# Patient Record
Sex: MALE | Race: WHITE | NOT HISPANIC OR LATINO | URBAN - METROPOLITAN AREA
[De-identification: names, ages, dates, MRNs, and addresses within clinical notes are randomized per-mention and may not be internally consistent; named-entity substitution may affect disease eponyms.]

---

## 2022-01-27 ENCOUNTER — INPATIENT (INPATIENT)
Facility: HOSPITAL | Age: 82
LOS: 0 days | Discharge: HOME | DRG: 310 | End: 2022-01-28
Attending: HOSPITALIST | Admitting: HOSPITALIST
Payer: COMMERCIAL

## 2022-01-27 VITALS
HEART RATE: 174 BPM | HEIGHT: 70 IN | DIASTOLIC BLOOD PRESSURE: 62 MMHG | RESPIRATION RATE: 18 BRPM | OXYGEN SATURATION: 96 % | SYSTOLIC BLOOD PRESSURE: 92 MMHG | WEIGHT: 184.09 LBS

## 2022-01-27 DIAGNOSIS — Z96.641 PRESENCE OF RIGHT ARTIFICIAL HIP JOINT: Chronic | ICD-10-CM

## 2022-01-27 DIAGNOSIS — I48.91 UNSPECIFIED ATRIAL FIBRILLATION: ICD-10-CM

## 2022-01-27 DIAGNOSIS — E78.5 HYPERLIPIDEMIA, UNSPECIFIED: ICD-10-CM

## 2022-01-27 DIAGNOSIS — R07.9 CHEST PAIN, UNSPECIFIED: ICD-10-CM

## 2022-01-27 LAB
ALBUMIN SERPL ELPH-MCNC: 3.7 G/DL — SIGNIFICANT CHANGE UP (ref 3.3–5)
ALP SERPL-CCNC: 58 U/L — SIGNIFICANT CHANGE UP (ref 40–120)
ALT FLD-CCNC: 17 U/L — SIGNIFICANT CHANGE UP (ref 10–45)
ANION GAP SERPL CALC-SCNC: 11 MMOL/L — SIGNIFICANT CHANGE UP (ref 5–17)
APTT BLD: 36 SEC — HIGH (ref 27.5–35.5)
AST SERPL-CCNC: 22 U/L — SIGNIFICANT CHANGE UP (ref 10–40)
BASOPHILS # BLD AUTO: 0.02 K/UL — SIGNIFICANT CHANGE UP (ref 0–0.2)
BASOPHILS NFR BLD AUTO: 0.3 % — SIGNIFICANT CHANGE UP (ref 0–2)
BILIRUB SERPL-MCNC: 0.3 MG/DL — SIGNIFICANT CHANGE UP (ref 0.2–1.2)
BUN SERPL-MCNC: 18 MG/DL — SIGNIFICANT CHANGE UP (ref 7–23)
CALCIUM SERPL-MCNC: 8.8 MG/DL — SIGNIFICANT CHANGE UP (ref 8.4–10.5)
CHLORIDE SERPL-SCNC: 113 MMOL/L — HIGH (ref 96–108)
CO2 SERPL-SCNC: 21 MMOL/L — LOW (ref 22–31)
CREAT SERPL-MCNC: 0.99 MG/DL — SIGNIFICANT CHANGE UP (ref 0.5–1.3)
EOSINOPHIL # BLD AUTO: 0.05 K/UL — SIGNIFICANT CHANGE UP (ref 0–0.5)
EOSINOPHIL NFR BLD AUTO: 0.6 % — SIGNIFICANT CHANGE UP (ref 0–6)
GLUCOSE SERPL-MCNC: 113 MG/DL — HIGH (ref 70–99)
HCT VFR BLD CALC: 39.3 % — SIGNIFICANT CHANGE UP (ref 39–50)
HGB BLD-MCNC: 13 G/DL — SIGNIFICANT CHANGE UP (ref 13–17)
IMM GRANULOCYTES NFR BLD AUTO: 0.3 % — SIGNIFICANT CHANGE UP (ref 0–1.5)
INR BLD: 1.05 — SIGNIFICANT CHANGE UP (ref 0.88–1.16)
LYMPHOCYTES # BLD AUTO: 0.76 K/UL — LOW (ref 1–3.3)
LYMPHOCYTES # BLD AUTO: 9.6 % — LOW (ref 13–44)
MCHC RBC-ENTMCNC: 31 PG — SIGNIFICANT CHANGE UP (ref 27–34)
MCHC RBC-ENTMCNC: 33.1 GM/DL — SIGNIFICANT CHANGE UP (ref 32–36)
MCV RBC AUTO: 93.8 FL — SIGNIFICANT CHANGE UP (ref 80–100)
MONOCYTES # BLD AUTO: 0.41 K/UL — SIGNIFICANT CHANGE UP (ref 0–0.9)
MONOCYTES NFR BLD AUTO: 5.2 % — SIGNIFICANT CHANGE UP (ref 2–14)
NEUTROPHILS # BLD AUTO: 6.64 K/UL — SIGNIFICANT CHANGE UP (ref 1.8–7.4)
NEUTROPHILS NFR BLD AUTO: 84 % — HIGH (ref 43–77)
NRBC # BLD: 0 /100 WBCS — SIGNIFICANT CHANGE UP (ref 0–0)
NT-PROBNP SERPL-SCNC: 114 PG/ML — SIGNIFICANT CHANGE UP (ref 0–300)
PLATELET # BLD AUTO: 210 K/UL — SIGNIFICANT CHANGE UP (ref 150–400)
POTASSIUM SERPL-MCNC: 3.8 MMOL/L — SIGNIFICANT CHANGE UP (ref 3.5–5.3)
POTASSIUM SERPL-SCNC: 3.8 MMOL/L — SIGNIFICANT CHANGE UP (ref 3.5–5.3)
PROT SERPL-MCNC: 5.6 G/DL — LOW (ref 6–8.3)
PROTHROM AB SERPL-ACNC: 12.6 SEC — SIGNIFICANT CHANGE UP (ref 10.6–13.6)
RBC # BLD: 4.19 M/UL — LOW (ref 4.2–5.8)
RBC # FLD: 12.7 % — SIGNIFICANT CHANGE UP (ref 10.3–14.5)
SARS-COV-2 RNA SPEC QL NAA+PROBE: SIGNIFICANT CHANGE UP
SODIUM SERPL-SCNC: 145 MMOL/L — SIGNIFICANT CHANGE UP (ref 135–145)
TROPONIN T SERPL-MCNC: 0.01 NG/ML — SIGNIFICANT CHANGE UP (ref 0–0.01)
WBC # BLD: 7.9 K/UL — SIGNIFICANT CHANGE UP (ref 3.8–10.5)
WBC # FLD AUTO: 7.9 K/UL — SIGNIFICANT CHANGE UP (ref 3.8–10.5)

## 2022-01-27 PROCEDURE — 99291 CRITICAL CARE FIRST HOUR: CPT | Mod: 25

## 2022-01-27 PROCEDURE — 71045 X-RAY EXAM CHEST 1 VIEW: CPT | Mod: 26

## 2022-01-27 PROCEDURE — 99223 1ST HOSP IP/OBS HIGH 75: CPT

## 2022-01-27 PROCEDURE — 93010 ELECTROCARDIOGRAM REPORT: CPT

## 2022-01-27 RX ORDER — SODIUM CHLORIDE 9 MG/ML
1000 INJECTION INTRAMUSCULAR; INTRAVENOUS; SUBCUTANEOUS ONCE
Refills: 0 | Status: COMPLETED | OUTPATIENT
Start: 2022-01-27 | End: 2022-01-27

## 2022-01-27 RX ORDER — METOPROLOL TARTRATE 50 MG
25 TABLET ORAL
Refills: 0 | Status: DISCONTINUED | OUTPATIENT
Start: 2022-01-27 | End: 2022-01-28

## 2022-01-27 RX ORDER — SIMVASTATIN 20 MG/1
20 TABLET, FILM COATED ORAL AT BEDTIME
Refills: 0 | Status: DISCONTINUED | OUTPATIENT
Start: 2022-01-27 | End: 2022-01-28

## 2022-01-27 RX ORDER — METOPROLOL TARTRATE 50 MG
5 TABLET ORAL ONCE
Refills: 0 | Status: COMPLETED | OUTPATIENT
Start: 2022-01-27 | End: 2022-01-27

## 2022-01-27 RX ORDER — HEPARIN SODIUM 5000 [USP'U]/ML
INJECTION INTRAVENOUS; SUBCUTANEOUS
Qty: 25000 | Refills: 0 | Status: DISCONTINUED | OUTPATIENT
Start: 2022-01-27 | End: 2022-01-28

## 2022-01-27 RX ORDER — MAGNESIUM SULFATE 500 MG/ML
4 VIAL (ML) INJECTION ONCE
Refills: 0 | Status: COMPLETED | OUTPATIENT
Start: 2022-01-27 | End: 2022-01-27

## 2022-01-27 RX ADMIN — Medication 25 MILLIGRAM(S): at 21:48

## 2022-01-27 RX ADMIN — HEPARIN SODIUM 1500 UNIT(S)/HR: 5000 INJECTION INTRAVENOUS; SUBCUTANEOUS at 22:03

## 2022-01-27 RX ADMIN — HEPARIN SODIUM 1500 UNIT(S)/HR: 5000 INJECTION INTRAVENOUS; SUBCUTANEOUS at 21:15

## 2022-01-27 RX ADMIN — Medication 200 GRAM(S): at 17:33

## 2022-01-27 RX ADMIN — SODIUM CHLORIDE 1000 MILLILITER(S): 9 INJECTION INTRAMUSCULAR; INTRAVENOUS; SUBCUTANEOUS at 16:46

## 2022-01-27 RX ADMIN — Medication 5 MILLIGRAM(S): at 17:36

## 2022-01-27 RX ADMIN — SIMVASTATIN 20 MILLIGRAM(S): 20 TABLET, FILM COATED ORAL at 23:03

## 2022-01-27 RX ADMIN — Medication 5 MILLIGRAM(S): at 16:50

## 2022-01-27 NOTE — H&P ADULT - PROBLEM SELECTOR PLAN 1
upon arrival to ED HR ranging 130s-170s, EKG revealed Afib with RVR@133BPM, no acute ischemic changes.   --received Lopressor 5mg IV x 2 doses with improvement in HR to 110s in ED.  --will start Metoprolol Tartrate 25mg PO BID for rate control and continue Heparin gtt for anticoagulation.  --obtain Echocardiogram for structural assessment, will F/U thyroid studies.  --NPO after midnight for possible EP consult/DCCV.

## 2022-01-27 NOTE — ED ADULT NURSE NOTE - OBJECTIVE STATEMENT
Pt presents to ED by EMS C/O chest discomfort, dizziness, and weakness x 1 hour. As per EMS EKG, pt. in Afib.

## 2022-01-27 NOTE — H&P ADULT - ASSESSMENT
81 yr old M with strong FHx of CAD, PMHx of polio, hyperlipidemia who presents to [facility] ED 1/27/22 c/o SSCP and palpitations x 1 hour, EKG revealed Afib with RVR, CE negative x 1 set, admitted to cardiac telemetry for management of new onset Afib and further cardiac work-up.

## 2022-01-27 NOTE — H&P ADULT - NSICDXFAMILYHX_GEN_ALL_CORE_FT
FAMILY HISTORY:  Sibling  Still living? Unknown  FH: atrial fibrillation, Age at diagnosis: 51-60  FH: heart disease, Age at diagnosis: 61-70

## 2022-01-27 NOTE — ED ADULT TRIAGE NOTE - NS ED TRIAGE AVPU SCALE
Present, accurate, and signed
Alert-The patient is alert, awake and responds to voice. The patient is oriented to time, place, and person. The triage nurse is able to obtain subjective information.

## 2022-01-27 NOTE — ED ADULT TRIAGE NOTE - CHIEF COMPLAINT QUOTE
Pt presents to ED by EMS C/O chest discomfort, dizziness, and weakness x 1 hour. EMS reports pt in rapid AFIB. No known hx of AFIB, pt has family cardiac hx. Denies blood thinners. Pt alert and awake, appears pale and nervous, Wife at bedside. L hand #18 placed PTA. Pt has EKG in progress.

## 2022-01-27 NOTE — ED PROVIDER NOTE - OBJECTIVE STATEMENT
80 y/o M with PMHx of polio at age 6 with multiple surgeries, multiple ortho surgeries, HLD on statin, no hx of CHF or CAD. Patient went to ortho for injection to back today, walking home and on the way felt SOB, palpitations ("heart beating fast") and got dizzy. On presentation patient is in rapid afib, no hx of afib in past. No chest pain. PCP at [facility], no dedicated cardiology f/u. Deneis fever, chills, leg swelling, chest pain, headache, or any other complaints. No cardiac w/u in recent history.

## 2022-01-27 NOTE — H&P ADULT - PROBLEM SELECTOR PLAN 3
continue HOME MED: Simvastatin 20mg PO qhs        N: DASH, NPO after midnight  E: Maintain K>4.0 and Mg >2.0  VTE PPx: on Heparin gtt  Code: Full

## 2022-01-27 NOTE — H&P ADULT - PROBLEM SELECTOR PLAN 2
currently chest pain free, symptoms improved after HR controlled.  --EKG Afib without ischemic changes.  --CE negative x 1 set, will F/U CE@10PM and 5AM.  --F/U Echo results, consider ischemic eval.

## 2022-01-27 NOTE — ED PROVIDER NOTE - CLINICAL SUMMARY MEDICAL DECISION MAKING FREE TEXT BOX
82 y/o M with new onset rapid afib. No hx CAD, CHF, does not appear to be clinically overloaded or in heart failure. HR ranging from 130s to 160s. Will obtain cardiac labs, give Saline 1L, Lopressor 5, get bedside echo. Possible admission for new onset afib. 82 y/o M with new onset rapid afib. No hx CAD, CHF, does not appear to be clinically overloaded or in heart failure. HR ranging from 130s to 160s. Will obtain cardiac labs, give Saline 1L, Lopressor 5, get bedside echo. Possible admission for new onset afib.    Lopressor 5IV x 2 and Mg 4 G IV - rate controlled to 100-110 - discussed with cards fellow - agrees with heparin drip and admission for further workup r/o ischemia as cause of a fib.

## 2022-01-27 NOTE — H&P ADULT - HISTORY OF PRESENT ILLNESS
81 yr old M with PMHx of polio (at age 6 with multiple ortho surgeries), hyperlipidemia who presents to [facility] ED 1/27/22 c/o SOB and palpitations x 1 hour. Patient reports he went to his orthopedist to receive a back injection, on his walk back home he felt sudden onset of palpitations, dizziness and SOB. Patient denies any N/V, diaphoresis, chest pain, PND, orthopnea, LE edema, recent travel or sick contacts.    In ED, BP: 92/62, HR: 130s-170s, RR:18, Temp: 98F, O2 sat: 96% RA. EKG revealed Afib with RVR with VR 130s, no acute ST/T wave changes. CXR unremarkable. Labs notable for: Cardiac enzymes negative x 1 set, .     Patient treated with 1 liter IVFs, Lopressor 5mg IV x 2 doses and Mg 4g IV x 1 dose. Patient also started on Heparin gtt for anticoagulation.    Patient currently stable, admitted to cardiac telemetry for management of new onset Afib.   81 yr old M with strong FHx of CAD (brother had MI in his 60s), PMHx of polio (at age 6 with multiple ortho surgeries), hyperlipidemia who presents to [facility] ED 1/27/22 c/o SSCP and palpitations x 1 hour. Patient reports earlier today he went to his orthopedist to receive a back injection for lower back pain and was in his usual state of health. However, a few hours after his appointment he felt sudden onset of sudden onset of nonradiating substernal chest pressure, palpitations and dizziness. Patient denies any N/V, diaphoresis, SOB, PND, orthopnea, LE edema, recent travel or sick contacts. Patient denies similar symptoms in the past, last Echocardiogram was ~15 years ago which was normal.    In ED, BP: 92/62, HR: 130s-170s, RR:18, Temp: 98F, O2 sat: 96% RA. EKG revealed Afib with RVR with VR 130s, no acute ST/T wave changes. CXR unremarkable. Labs notable for: Cardiac enzymes negative x 1 set, . Patient treated with 1 liter IVFs, Lopressor 5mg IV x 2 doses and Mg 4g IV x 1 dose. Patient also started on Heparin gtt for anticoagulation.    Patient currently stable, admitted to cardiac telemetry for management of new onset Afib.

## 2022-01-28 ENCOUNTER — TRANSCRIPTION ENCOUNTER (OUTPATIENT)
Age: 82
End: 2022-01-28

## 2022-01-28 VITALS — TEMPERATURE: 97 F

## 2022-01-28 LAB
A1C WITH ESTIMATED AVERAGE GLUCOSE RESULT: 5.9 % — HIGH (ref 4–5.6)
ANION GAP SERPL CALC-SCNC: 11 MMOL/L — SIGNIFICANT CHANGE UP (ref 5–17)
ANION GAP SERPL CALC-SCNC: 9 MMOL/L — SIGNIFICANT CHANGE UP (ref 5–17)
APTT BLD: 130.5 SEC — CRITICAL HIGH (ref 27.5–35.5)
BUN SERPL-MCNC: 21 MG/DL — SIGNIFICANT CHANGE UP (ref 7–23)
BUN SERPL-MCNC: 23 MG/DL — SIGNIFICANT CHANGE UP (ref 7–23)
CALCIUM SERPL-MCNC: 9.1 MG/DL — SIGNIFICANT CHANGE UP (ref 8.4–10.5)
CALCIUM SERPL-MCNC: 9.2 MG/DL — SIGNIFICANT CHANGE UP (ref 8.4–10.5)
CHLORIDE SERPL-SCNC: 109 MMOL/L — HIGH (ref 96–108)
CHLORIDE SERPL-SCNC: 112 MMOL/L — HIGH (ref 96–108)
CHOLEST SERPL-MCNC: 117 MG/DL — SIGNIFICANT CHANGE UP
CK MB CFR SERPL CALC: 2.5 NG/ML — SIGNIFICANT CHANGE UP (ref 0–6.7)
CK MB CFR SERPL CALC: 2.5 NG/ML — SIGNIFICANT CHANGE UP (ref 0–6.7)
CK SERPL-CCNC: 88 U/L — SIGNIFICANT CHANGE UP (ref 30–200)
CK SERPL-CCNC: 93 U/L — SIGNIFICANT CHANGE UP (ref 30–200)
CO2 SERPL-SCNC: 22 MMOL/L — SIGNIFICANT CHANGE UP (ref 22–31)
CO2 SERPL-SCNC: 22 MMOL/L — SIGNIFICANT CHANGE UP (ref 22–31)
CREAT SERPL-MCNC: 0.92 MG/DL — SIGNIFICANT CHANGE UP (ref 0.5–1.3)
CREAT SERPL-MCNC: 0.97 MG/DL — SIGNIFICANT CHANGE UP (ref 0.5–1.3)
ESTIMATED AVERAGE GLUCOSE: 123 MG/DL — HIGH (ref 68–114)
GLUCOSE SERPL-MCNC: 119 MG/DL — HIGH (ref 70–99)
GLUCOSE SERPL-MCNC: 140 MG/DL — HIGH (ref 70–99)
HCT VFR BLD CALC: 43.8 % — SIGNIFICANT CHANGE UP (ref 39–50)
HDLC SERPL-MCNC: 60 MG/DL — SIGNIFICANT CHANGE UP
HGB BLD-MCNC: 13.7 G/DL — SIGNIFICANT CHANGE UP (ref 13–17)
LIPID PNL WITH DIRECT LDL SERPL: 47 MG/DL — SIGNIFICANT CHANGE UP
MAGNESIUM SERPL-MCNC: 2.4 MG/DL — SIGNIFICANT CHANGE UP (ref 1.6–2.6)
MCHC RBC-ENTMCNC: 30.3 PG — SIGNIFICANT CHANGE UP (ref 27–34)
MCHC RBC-ENTMCNC: 31.3 GM/DL — LOW (ref 32–36)
MCV RBC AUTO: 96.9 FL — SIGNIFICANT CHANGE UP (ref 80–100)
NON HDL CHOLESTEROL: 57 MG/DL — SIGNIFICANT CHANGE UP
NRBC # BLD: 0 /100 WBCS — SIGNIFICANT CHANGE UP (ref 0–0)
PLATELET # BLD AUTO: 231 K/UL — SIGNIFICANT CHANGE UP (ref 150–400)
POTASSIUM SERPL-MCNC: 3.9 MMOL/L — SIGNIFICANT CHANGE UP (ref 3.5–5.3)
POTASSIUM SERPL-MCNC: 4 MMOL/L — SIGNIFICANT CHANGE UP (ref 3.5–5.3)
POTASSIUM SERPL-SCNC: 3.9 MMOL/L — SIGNIFICANT CHANGE UP (ref 3.5–5.3)
POTASSIUM SERPL-SCNC: 4 MMOL/L — SIGNIFICANT CHANGE UP (ref 3.5–5.3)
RBC # BLD: 4.52 M/UL — SIGNIFICANT CHANGE UP (ref 4.2–5.8)
RBC # FLD: 13 % — SIGNIFICANT CHANGE UP (ref 10.3–14.5)
SODIUM SERPL-SCNC: 142 MMOL/L — SIGNIFICANT CHANGE UP (ref 135–145)
SODIUM SERPL-SCNC: 143 MMOL/L — SIGNIFICANT CHANGE UP (ref 135–145)
T4 AB SER-ACNC: 6.27 UG/DL — SIGNIFICANT CHANGE UP (ref 4.5–11.7)
TRIGL SERPL-MCNC: 49 MG/DL — SIGNIFICANT CHANGE UP
TROPONIN T SERPL-MCNC: 0.01 NG/ML — SIGNIFICANT CHANGE UP (ref 0–0.01)
TROPONIN T SERPL-MCNC: <0.01 NG/ML — SIGNIFICANT CHANGE UP (ref 0–0.01)
TSH SERPL-MCNC: 2.38 UIU/ML — SIGNIFICANT CHANGE UP (ref 0.27–4.2)
WBC # BLD: 9.84 K/UL — SIGNIFICANT CHANGE UP (ref 3.8–10.5)
WBC # FLD AUTO: 9.84 K/UL — SIGNIFICANT CHANGE UP (ref 3.8–10.5)

## 2022-01-28 PROCEDURE — 99239 HOSP IP/OBS DSCHRG MGMT >30: CPT

## 2022-01-28 PROCEDURE — U0003: CPT

## 2022-01-28 PROCEDURE — 93005 ELECTROCARDIOGRAM TRACING: CPT

## 2022-01-28 PROCEDURE — 96374 THER/PROPH/DIAG INJ IV PUSH: CPT

## 2022-01-28 PROCEDURE — 99291 CRITICAL CARE FIRST HOUR: CPT | Mod: 25

## 2022-01-28 PROCEDURE — 36415 COLL VENOUS BLD VENIPUNCTURE: CPT

## 2022-01-28 PROCEDURE — 85730 THROMBOPLASTIN TIME PARTIAL: CPT

## 2022-01-28 PROCEDURE — 84484 ASSAY OF TROPONIN QUANT: CPT

## 2022-01-28 PROCEDURE — 84443 ASSAY THYROID STIM HORMONE: CPT

## 2022-01-28 PROCEDURE — 85610 PROTHROMBIN TIME: CPT

## 2022-01-28 PROCEDURE — 84436 ASSAY OF TOTAL THYROXINE: CPT

## 2022-01-28 PROCEDURE — 99223 1ST HOSP IP/OBS HIGH 75: CPT

## 2022-01-28 PROCEDURE — 82553 CREATINE MB FRACTION: CPT

## 2022-01-28 PROCEDURE — 82550 ASSAY OF CK (CPK): CPT

## 2022-01-28 PROCEDURE — 85027 COMPLETE CBC AUTOMATED: CPT

## 2022-01-28 PROCEDURE — U0005: CPT

## 2022-01-28 PROCEDURE — 83036 HEMOGLOBIN GLYCOSYLATED A1C: CPT

## 2022-01-28 PROCEDURE — 83880 ASSAY OF NATRIURETIC PEPTIDE: CPT

## 2022-01-28 PROCEDURE — 80048 BASIC METABOLIC PNL TOTAL CA: CPT

## 2022-01-28 PROCEDURE — C8929: CPT

## 2022-01-28 PROCEDURE — 93306 TTE W/DOPPLER COMPLETE: CPT | Mod: 26

## 2022-01-28 PROCEDURE — 83735 ASSAY OF MAGNESIUM: CPT

## 2022-01-28 PROCEDURE — 93010 ELECTROCARDIOGRAM REPORT: CPT

## 2022-01-28 PROCEDURE — 80061 LIPID PANEL: CPT

## 2022-01-28 PROCEDURE — 80053 COMPREHEN METABOLIC PANEL: CPT

## 2022-01-28 PROCEDURE — 71045 X-RAY EXAM CHEST 1 VIEW: CPT

## 2022-01-28 PROCEDURE — 85025 COMPLETE CBC W/AUTO DIFF WBC: CPT

## 2022-01-28 RX ORDER — METOPROLOL TARTRATE 50 MG
5 TABLET ORAL ONCE
Refills: 0 | Status: COMPLETED | OUTPATIENT
Start: 2022-01-28 | End: 2022-01-28

## 2022-01-28 RX ORDER — APIXABAN 2.5 MG/1
5 TABLET, FILM COATED ORAL EVERY 12 HOURS
Refills: 0 | Status: DISCONTINUED | OUTPATIENT
Start: 2022-01-28 | End: 2022-01-28

## 2022-01-28 RX ORDER — METOPROLOL TARTRATE 50 MG
25 TABLET ORAL EVERY 8 HOURS
Refills: 0 | Status: DISCONTINUED | OUTPATIENT
Start: 2022-01-28 | End: 2022-01-28

## 2022-01-28 RX ORDER — METOPROLOL TARTRATE 50 MG
1 TABLET ORAL
Qty: 30 | Refills: 3
Start: 2022-01-28 | End: 2022-05-27

## 2022-01-28 RX ORDER — APIXABAN 2.5 MG/1
1 TABLET, FILM COATED ORAL
Qty: 60 | Refills: 0
Start: 2022-01-28 | End: 2022-02-26

## 2022-01-28 RX ORDER — METOPROLOL TARTRATE 50 MG
1 TABLET ORAL
Qty: 30 | Refills: 0
Start: 2022-01-28 | End: 2022-02-26

## 2022-01-28 RX ORDER — APIXABAN 2.5 MG/1
1 TABLET, FILM COATED ORAL
Qty: 60 | Refills: 10
Start: 2022-01-28 | End: 2022-12-23

## 2022-01-28 RX ADMIN — Medication 5 MILLIGRAM(S): at 04:49

## 2022-01-28 RX ADMIN — Medication 25 MILLIGRAM(S): at 06:30

## 2022-01-28 RX ADMIN — APIXABAN 5 MILLIGRAM(S): 2.5 TABLET, FILM COATED ORAL at 10:38

## 2022-01-28 NOTE — CONSULT NOTE ADULT - SUBJECTIVE AND OBJECTIVE BOX
Electrophysiology Consult Note:     CHIEF COMPLAINT:  Patient is a 81y old  Male who presents with a chief complaint of       HISTORY OF PRESENT ILLNESS:   81 yr old M with strong FHx of CAD (father had MI, younger brother has AFIB), PMHx of polio (at age 6 with multiple ortho surgeries), hyperlipidemia who presents to [facility] ED 22 c/o SSCP and palpitations x 1 hour. Patient reports earlier today he went to his orthopedist to receive a back injection for lower back pain and was in his usual state of health. However, a few hours after his appointment he felt sudden onset of sudden onset of nonradiating substernal chest pressure, palpitations and dizziness. Patient denies syncope, dizziness, fatigue.  He reports walking his dog daily and denies exertional CP and SOB with walking.  He was found to have AFIB RVR in the ER (VR up to 140s bpm).  He was started on metoprolol for rate control and Hep gtt. Heparin gtt was switched to eliquis this morning.  EP is called to evaluate for Ivan/dccv.   Has occasional constipation which resulted in some blood on toilet tissue.  Other than that, denies major bleeding issue.   No prior CTA coronary. No recent echo outpatient.       PAST MEDICAL & SURGICAL HISTORY:  Polio  Hyperlipidemia  S/P hip replacement, right      FAMILY HISTORY:  FH: heart disease -- father   FH: atrial fibrillation (Sibling)      SOCIAL HISTORY:    no etoh / tob /illicit drugs     Allergies  No Known Allergies      MEDICATIONS  (STANDING):  apixaban 5 milliGRAM(s) Oral every 12 hours  metoprolol tartrate 25 milliGRAM(s) Oral every 8 hours  simvastatin 20 milliGRAM(s) Oral at bedtime        REVIEW OF SYSTEMS:  CONSTITUTIONAL: No fever, weight loss, or fatigue  EYES: No eye pain, visual disturbances, or discharge  ENMT:  No difficulty hearing, tinnitus, vertigo; No sinus or throat pain  NECK: No pain or stiffness  BREASTS: No pain, masses, or nipple discharge  RESPIRATORY: No cough, wheezing, chills or hemoptysis; No Shortness of Breath  CARDIOVASCULAR: See HPI. NO LE edema.   GASTROINTESTINAL: No abdominal or epigastric pain. No nausea, vomiting, or hematemesis; No diarrhea. No melena or hematochezia. + occasional constipation   GENITOURINARY: No dysuria, frequency, hematuria, or incontinence  NEUROLOGICAL: No headaches, memory loss, loss of strength, numbness, or tremors  SKIN: No itching, burning, rashes, or lesions   LYMPH Nodes: No enlarged glands  ENDOCRINE: No heat or cold intolerance; No hair loss  MUSCULOSKELETAL: No joint pain or swelling; No muscle, back, or extremity pain  PSYCHIATRIC: No depression, anxiety, mood swings, or difficulty sleeping  HEME/LYMPH: No easy bruising, or bleeding gums  ALLERY AND IMMUNOLOGIC: No hives or eczema	      PHYSICAL EXAM:  Vital Signs Last 24 Hrs  T(C): 36.3 (2022 10:50), Max: 36.7 (2022 20:16)  T(F): 97.4 (2022 10:50), Max: 98.1 (2022 23:03)  HR: 61 (2022 12:38) (61 - 174)  BP: 107/58 (2022 12:38) (92/62 - 129/71)  BP(mean): 82 (2022 20:16) (82 - 82)  RR: 17 (2022 12:38) (16 - 18)  SpO2: 95% (2022 12:38) (95% - 99%)  Daily Height in cm: 177.8 (2022 01:10)    Daily     Constitutional: NAD	  HEENT:   Normal oral mucosa, PERRL, EOMI	  Neck: No JVD  CVS: Normal S1 / S2, irregular, No murmurs  Pulm: CTA. No wheeze or rale  GI:  + BS, soft, NT / ND   Ext: No LE edema  Vascular: Peripheral pulses palpable 2+ bilaterally  MS: full range of motion in all joints  Neurologic: A&O x 3, Non-focal  Psych: Pleasant, has good insight  Skin: No rash or lesion       	  LABS:	                         13.7   9.84  )-----------( 231      ( 2022 07:33 )             43.8         142  |  109<H>  |  21  ----------------------------<  119<H>  3.9   |  22  |  0.97    Ca    9.2      2022 07:33  Mg     2.4         TPro  5.6<L>  /  Alb  3.7  /  TBili  0.3  /  DBili  x   /  AST  22  /  ALT  17  /  AlkPhos  58      proBNP: Serum Pro-Brain Natriuretic Peptide: 114 pg/mL ( @ 17:31)  TSH: Thyroid Stimulating Hormone, Serum: 2.380 uIU/mL ( @ 07:33)  	  EK22: AFIB  bpm. Narrow QRS 76. QTc 437 ms.   Telemetry: AFIB VR 80-100s.       Electrophysiology Consult Note:     CHIEF COMPLAINT:  Patient is a 81y old  Male who presents with a chief complaint of       HISTORY OF PRESENT ILLNESS:   81 yr old M with strong FHx of CAD (father had MI, younger brother has AFIB), PMHx of polio (at age 6 with multiple ortho surgeries), hyperlipidemia who presents to [facility] ED 22 c/o SSCP and palpitations x 1 hour. Patient reports earlier today he went to his orthopedist to receive a back injection for lower back pain and was in his usual state of health. However, a few hours after his appointment he felt sudden onset of sudden onset of nonradiating substernal chest pressure, palpitations and dizziness. Patient denies syncope, dizziness, fatigue.  He reports walking his dog daily and denies exertional CP and SOB with walking.  He was found to have AFIB RVR in the ER (VR up to 140s bpm).  He was started on metoprolol for rate control and Hep gtt. Heparin gtt was switched to eliquis this morning.  EP is called to evaluate for Ivan/dccv.   Has occasional constipation which resulted in some blood on toilet tissue.  Other than that, denies major bleeding issue.   No prior CTA coronary. No recent echo outpatient.       PAST MEDICAL & SURGICAL HISTORY:  Polio  Hyperlipidemia  S/P hip replacement, right      FAMILY HISTORY:  FH: heart disease -- father   FH: atrial fibrillation (Sibling)      SOCIAL HISTORY:    no etoh / tob /illicit drugs     Allergies  No Known Allergies      MEDICATIONS  (STANDING):  apixaban 5 milliGRAM(s) Oral every 12 hours  metoprolol tartrate 25 milliGRAM(s) Oral every 8 hours  simvastatin 20 milliGRAM(s) Oral at bedtime        REVIEW OF SYSTEMS:  CONSTITUTIONAL: No fever, weight loss, or fatigue  EYES: No eye pain, visual disturbances, or discharge  ENMT:  No difficulty hearing, tinnitus, vertigo; No sinus or throat pain  NECK: No pain or stiffness  BREASTS: No pain, masses, or nipple discharge  RESPIRATORY: No cough, wheezing, chills or hemoptysis; No Shortness of Breath  CARDIOVASCULAR: See HPI. NO LE edema.   GASTROINTESTINAL: No abdominal or epigastric pain. No nausea, vomiting, or hematemesis; No diarrhea. No melena or hematochezia. + occasional constipation   GENITOURINARY: No dysuria, frequency, hematuria, or incontinence  NEUROLOGICAL: No headaches, memory loss, loss of strength, numbness, or tremors  SKIN: No itching, burning, rashes, or lesions   LYMPH Nodes: No enlarged glands  ENDOCRINE: No heat or cold intolerance; No hair loss  MUSCULOSKELETAL: No joint pain or swelling; No muscle, back, or extremity pain  PSYCHIATRIC: No depression, anxiety, mood swings, or difficulty sleeping  HEME/LYMPH: No easy bruising, or bleeding gums  ALLERY AND IMMUNOLOGIC: No hives or eczema	      PHYSICAL EXAM:  Vital Signs Last 24 Hrs  T(C): 36.3 (2022 10:50), Max: 36.7 (2022 20:16)  T(F): 97.4 (2022 10:50), Max: 98.1 (2022 23:03)  HR: 61 (2022 12:38) (61 - 174)  BP: 107/58 (2022 12:38) (92/62 - 129/71)  BP(mean): 82 (2022 20:16) (82 - 82)  RR: 17 (2022 12:38) (16 - 18)  SpO2: 95% (2022 12:38) (95% - 99%)  Daily Height in cm: 177.8 (2022 01:10)    Daily     Constitutional: NAD	  HEENT:   Normal oral mucosa, PERRL, EOMI	  Neck: No JVD  CVS: Normal S1 / S2, irregular, No murmurs  Pulm: CTA. No wheeze or rale  GI:  + BS, soft, NT / ND   Ext: No LE edema  Vascular: Peripheral pulses palpable 2+ bilaterally  MS: full range of motion in all joints  Neurologic: A&O x 3, Non-focal  Psych: Pleasant, has good insight  Skin: No rash or lesion       	  LABS:	                         13.7   9.84  )-----------( 231      ( 2022 07:33 )             43.8         142  |  109<H>  |  21  ----------------------------<  119<H>  3.9   |  22  |  0.97    Ca    9.2      2022 07:33  Mg     2.4         TPro  5.6<L>  /  Alb  3.7  /  TBili  0.3  /  DBili  x   /  AST  22  /  ALT  17  /  AlkPhos  58      proBNP: Serum Pro-Brain Natriuretic Peptide: 114 pg/mL ( @ 17:31)  TSH: Thyroid Stimulating Hormone, Serum: 2.380 uIU/mL ( @ 07:33)  	  EK22: AFIB  bpm. Narrow QRS 76. QTc 437 ms. NO St/T changes.   Telemetry: AFIB VR 80-100s.

## 2022-01-28 NOTE — DISCHARGE NOTE PROVIDER - NSDCCPCAREPLAN_GEN_ALL_CORE_FT
PRINCIPAL DISCHARGE DIAGNOSIS  Diagnosis: Rapid atrial fibrillation  Assessment and Plan of Treatment: You were found to have an abnormal heart rhythm called atrial fibrillation. When your heart in in this rhythm it beats very quickly and in an irregular pattern. There is an increased risk of stroke with this rhythm for which you were started on the blood thinning medication Eliquis (apixaban) 5 mg twice daily. For heart rate control please continue _____ . You underwent a cardioversion procedure which put your heart back into a normal rhythm. Please follow up with the electrophysiologist  ________  in 1-2 weeks.         PRINCIPAL DISCHARGE DIAGNOSIS  Diagnosis: Rapid atrial fibrillation  Assessment and Plan of Treatment: You were found to have an abnormal heart rhythm called atrial fibrillation. When your heart in in this rhythm it beats very quickly and in an irregular pattern. There is an increased risk of stroke with this rhythm for which you were started on the blood thinning medication Eliquis (apixaban) 5 mg twice daily. For heart rate control please continue metoprolol succinate 25 mg daily. Your heart back went back into a normal rhythm without any intervention. Please follow up with the electrophysiologist  ________  in 1-2 weeks.         PRINCIPAL DISCHARGE DIAGNOSIS  Diagnosis: Rapid atrial fibrillation  Assessment and Plan of Treatment: You were found to have an abnormal heart rhythm called atrial fibrillation. When your heart in in this rhythm it beats very quickly and in an irregular pattern. There is an increased risk of stroke with this rhythm for which you were started on the blood thinning medication Eliquis (apixaban) 5 mg twice daily. For heart rate control please continue metoprolol succinate 25 mg daily. Your heart back went back into a normal rhythm without any intervention. Please follow up with the electrophysiologist Dr Riley in 1-2 weeks.

## 2022-01-28 NOTE — PATIENT PROFILE ADULT - FALL HARM RISK - UNIVERSAL INTERVENTIONS
full weight-bearing
Bed in lowest position, wheels locked, appropriate side rails in place/Call bell, personal items and telephone in reach/Instruct patient to call for assistance before getting out of bed or chair/Non-slip footwear when patient is out of bed/[place] to call system/Physically safe environment - no spills, clutter or unnecessary equipment/Purposeful Proactive Rounding/Room/bathroom lighting operational, light cord in reach

## 2022-01-28 NOTE — DISCHARGE NOTE PROVIDER - NSDCMRMEDTOKEN_GEN_ALL_CORE_FT
Eliquis 5 mg oral tablet: 1 tab(s) orally 2 times a day   simvastatin 20 mg oral tablet: 1 tab(s) orally once a day (at bedtime)

## 2022-01-28 NOTE — CONSULT NOTE ADULT - ASSESSMENT
81 yr old M with strong FHx of CAD (father had MI, younger brother has AFIB), PMHx of polio (at age 6 with multiple ortho surgeries), hyperlipidemia, presented to the ER with palpitations and CP yesterday.  He is asymptomatic since rate was better controlled with metoprolol compared to yesterday in the ER. However, VR still  at rest.  Discussed with him the nature of AFIB.  He would benefit from a MADIHA to rule out LA/RODRÍGUEZ clot prior to DCCV.  Regardless, he understands that he would need to be on an anticoagulation for stroke prevention.   - He was noted to be back in NSR while on route to MADIHA this morning.    - Continue A/C  - Ok to discharge home with Toprol 25 mg daily.   - He can f/u with DR. Riley in office in 1 month.   81 yr old M with strong FHx of CAD (father had MI, younger brother has AFIB), PMHx of polio (at age 6 with multiple ortho surgeries), hyperlipidemia, presented to the ER with palpitations and CP yesterday.  He is asymptomatic since rate was better controlled with metoprolol compared to yesterday in the ER. However, VR still  at rest.  Discussed with him the nature of AFIB.  He would benefit from a MADIHA to rule out LA/RODRÍGUEZ clot prior to DCCV.  Regardless, he understands that he would need to be on an anticoagulation for stroke prevention.   - He was noted to be back in NSR while on route to MADIHA this morning.    - Continue A/C  - Ok to discharge home with Toprol 25 mg daily.   - He can f/u with DR. Riley in office in 1 month.

## 2022-01-28 NOTE — PATIENT PROFILE ADULT - INTERNATIONAL TRAVEL
-- Message is from the [facility]--    Referral Request  Name of Specialist:   Provider's specialty: Ophthalmologist    Medical condition for referral:  Annual eye exam.     Is this a NEW request?: yes      Referral ordered by: Patient Representative      Insurance type: Medicaid      Payor: [place] MEDICAID / Plan: [place]AL [place] MEDICAID / Product Type: T19      Preferred Delivery Method   Call when ready for pickup - phone number to notify: [phone]    Caller Information       Type Contact Phone    07/23/2019 03:46 PM Phone (Incoming) JOELANTONIA (Mother) [phone] (M)          Alternative phone number:     Turnaround time given to caller:   \"This message will be sent to [state Provider's full name]. The clinical team will return your call as soon as they review your message. Typically, it takes 3 business days to process referral requests.\"   No

## 2022-01-28 NOTE — DISCHARGE NOTE PROVIDER - CARE PROVIDER_API CALL
Javier Riley)  Cardiac Electrophysiology  [address], [address] [place] [address]  Phone: [phone]  Fax: [phone]  Established Patient  Follow Up Time:

## 2022-01-28 NOTE — DISCHARGE NOTE NURSING/CASE MANAGEMENT/SOCIAL WORK - PATIENT PORTAL LINK FT
You can access the FollowMyHealth Patient Portal offered by [facility] by registering at the following website: [email or web address]. By joining [employer]’s FollowMyHealth portal, you will also be able to view your health information using other applications (apps) compatible with our system.

## 2022-01-28 NOTE — DISCHARGE NOTE NURSING/CASE MANAGEMENT/SOCIAL WORK - NSDCPEFALRISK_GEN_ALL_CORE
For information on Fall & Injury Prevention, visit: [email or web address] OR  [email or web address] OR  https://www.cdc.gov/steadi/patient.html

## 2022-01-28 NOTE — DISCHARGE NOTE PROVIDER - HOSPITAL COURSE
81 yr old M with strong FHx of CAD (brother had MI in his 60s), PMHx of polio (at age 6 with multiple ortho surgeries), hyperlipidemia who presents to [facility] ED 1/27/22 c/o SSCP and palpitations x 1 hour. Patient reports earlier today he went to his orthopedist to receive a back injection for lower back pain and was in his usual state of health. However, a few hours after his appointment he felt sudden onset of sudden onset of nonradiating substernal chest pressure, palpitations and dizziness. Patient denies any N/V, diaphoresis, SOB, PND, orthopnea, LE edema, recent travel or sick contacts. Patient denies similar symptoms in the past, last Echocardiogram was ~15 years ago which was normal. In ED, BP: 92/62, HR: 130s-170s, RR:18, Temp: 98F, O2 sat: 96% RA. EKG revealed Afib with RVR with VR 130s, no acute ST/T wave changes. CXR unremarkable. Labs notable for: Cardiac enzymes negative x 1 set, . Patient treated with 1 liter IVFs, Lopressor 5mg IV x 2 doses and Mg 4g IV x 1 dose. Patient also started on Heparin gtt for anticoagulation. Patient currently stable, admitted to cardiac telemetry for management of new onset Afib. Cardiac enzymes negative x 3. EP consulted and MADIHA 1/28/22:     Pt will continue with Eliquis 5 mg BID for ac and rate control with _______.  Pt given appropriate d/c instructions and verbalized understanding. Medications sent to preferred pharmacy. Pt deemed stable for d/c per Dr. Christensen and will f/u with  _________ in 1-2 weeks.       81 yr old M with strong FHx of CAD (brother had MI in his 60s), PMHx of polio (at age 6 with multiple ortho surgeries), hyperlipidemia who presents to [facility] ED 1/27/22 c/o SSCP and palpitations x 1 hour. Patient reports earlier today he went to his orthopedist to receive a back injection for lower back pain and was in his usual state of health. However, a few hours after his appointment he felt sudden onset of sudden onset of nonradiating substernal chest pressure, palpitations and dizziness. Patient denies any N/V, diaphoresis, SOB, PND, orthopnea, LE edema, recent travel or sick contacts. Patient denies similar symptoms in the past, last Echocardiogram was ~15 years ago which was normal. In ED, BP: 92/62, HR: 130s-170s, RR:18, Temp: 98F, O2 sat: 96% RA. EKG revealed Afib with RVR with VR 130s, no acute ST/T wave changes. CXR unremarkable. Labs notable for: Cardiac enzymes negative x 1 set, . Patient treated with 1 liter IVFs, Lopressor 5mg IV x 2 doses and Mg 4g IV x 1 dose. Patient also started on Heparin gtt for anticoagulation. Patient currently stable, admitted to cardiac telemetry for management of new onset Afib. Cardiac enzymes negative x 3. EP consulted with plan for MADIHA and DCCV. On route to ECHO pt converted to NSR.   Pt will continue with Eliquis 5 mg BID for ac and rate control with toprol XL 25 mg QD.  Pt given appropriate d/c instructions and verbalized understanding. Medications sent to preferred pharmacy. Pt deemed stable for d/c per Dr. Christensen and will f/u with  _________ in 1-2 weeks.       81 yr old M with strong FHx of CAD (brother had MI in his 60s), PMHx of polio (at age 6 with multiple ortho surgeries), hyperlipidemia who presents to [facility] ED 1/27/22 c/o SSCP and palpitations x 1 hour. Patient reports earlier today he went to his orthopedist to receive a back injection for lower back pain and was in his usual state of health. However, a few hours after his appointment he felt sudden onset of sudden onset of nonradiating substernal chest pressure, palpitations and dizziness. Patient denies any N/V, diaphoresis, SOB, PND, orthopnea, LE edema, recent travel or sick contacts. Patient denies similar symptoms in the past, last Echocardiogram was ~15 years ago which was normal. In ED, BP: 92/62, HR: 130s-170s, RR:18, Temp: 98F, O2 sat: 96% RA. EKG revealed Afib with RVR with VR 130s, no acute ST/T wave changes. CXR unremarkable. Labs notable for: Cardiac enzymes negative x 1 set, . Patient treated with 1 liter IVFs, Lopressor 5mg IV x 2 doses and Mg 4g IV x 1 dose. Patient also started on Heparin gtt for anticoagulation. Patient currently stable, admitted to cardiac telemetry for management of new onset Afib. Cardiac enzymes negative x 3. EP consulted with plan for MADIHA and DCCV. On route to ECHO pt converted to NSR.   Pt will continue with Eliquis 5 mg BID for ac and rate control with toprol XL 25 mg QD.  Pt given appropriate d/c instructions and verbalized understanding. Medications sent to preferred pharmacy. Pt deemed stable for d/c per Dr. Christensen and will f/u with Dr. Riley in 1-2 weeks.

## 2022-02-02 DIAGNOSIS — R00.2 PALPITATIONS: ICD-10-CM

## 2022-02-02 DIAGNOSIS — E78.5 HYPERLIPIDEMIA, UNSPECIFIED: ICD-10-CM

## 2022-02-02 DIAGNOSIS — I25.10 ATHEROSCLEROTIC HEART DISEASE OF NATIVE CORONARY ARTERY WITHOUT ANGINA PECTORIS: ICD-10-CM

## 2022-02-02 DIAGNOSIS — I48.91 UNSPECIFIED ATRIAL FIBRILLATION: ICD-10-CM

## 2022-02-02 DIAGNOSIS — Z86.12 PERSONAL HISTORY OF POLIOMYELITIS: ICD-10-CM

## 2022-02-02 DIAGNOSIS — Z96.641 PRESENCE OF RIGHT ARTIFICIAL HIP JOINT: ICD-10-CM

## 2022-02-02 DIAGNOSIS — K59.00 CONSTIPATION, UNSPECIFIED: ICD-10-CM

## 2022-02-08 ENCOUNTER — NON-APPOINTMENT (OUTPATIENT)
Age: 82
End: 2022-02-08

## 2022-02-08 ENCOUNTER — APPOINTMENT (OUTPATIENT)
Dept: HEART AND VASCULAR | Facility: CLINIC | Age: 82
End: 2022-02-08
Payer: MEDICARE

## 2022-02-08 VITALS
HEIGHT: 70 IN | HEART RATE: 56 BPM | WEIGHT: 178 LBS | SYSTOLIC BLOOD PRESSURE: 130 MMHG | DIASTOLIC BLOOD PRESSURE: 74 MMHG | BODY MASS INDEX: 25.48 KG/M2 | OXYGEN SATURATION: 96 %

## 2022-02-08 PROCEDURE — 93000 ELECTROCARDIOGRAM COMPLETE: CPT

## 2022-02-08 PROCEDURE — 99214 OFFICE O/P EST MOD 30 MIN: CPT | Mod: 25

## 2022-02-08 PROCEDURE — 99204 OFFICE O/P NEW MOD 45 MIN: CPT | Mod: 25

## 2022-02-15 NOTE — PHYSICAL EXAM
[Well Nourished] : well nourished [No Acute Distress] : no acute distress [Normal Venous Pressure] : normal venous pressure [Normal S1, S2] : normal S1, S2 [No Murmur] : no murmur [Clear Lung Fields] : clear lung fields [Non Tender] : non-tender [No Edema] : no edema [No Focal Deficits] : no focal deficits [Alert and Oriented] : alert and oriented

## 2022-02-15 NOTE — HISTORY OF PRESENT ILLNESS
[FreeTextEntry1] : 81M w/ famhx of CAD presented to [facility] ER on 1/27 w/ new onset Afib w/ RVR, he was subsequently admitted to cardiac telemetry for cardiac w/u. He spontaneously converted to NSR prior to MADIHA/DCCV. He was ultimatel;y discharged on Toprol on Eliquis.\par \par Today he presents for his first follow-up since discharge from the hospital on 1/28. He currently feels well. He does note chronic back pain. He otherwise denies F/C, CP, SOB,Palpitations, lightheadedness, dizziness or syncope.

## 2022-02-15 NOTE — DISCUSSION/SUMMARY
[FreeTextEntry1] : 1.Paroxysmal AFib - currently in NSR - EKG: Sinus Bradycardia, otherwise HD stable and asymptomatic. \par -c/w Toprol 25\par -Will reduce Eliquis to 2.5 BID - Patient takes frequent NSAIDs for back pain. Also started PPI daily\par -All meds refilled\par -f/u in 4-5 months

## 2022-02-15 NOTE — REVIEW OF SYSTEMS
[Fever] : no fever [Chills] : no chills [Feeling Fatigued] : not feeling fatigued [SOB] : no shortness of breath [Dyspnea on exertion] : not dyspnea during exertion [Chest Discomfort] : no chest discomfort [Lower Ext Edema] : no extremity edema [Palpitations] : no palpitations [Orthopnea] : no orthopnea [Syncope] : no syncope [Cough] : no cough [Abdominal Pain] : no abdominal pain [Nausea] : no nausea [Vomiting] : no vomiting [Urinary Frequency] : no change in urinary frequency [Dizziness] : no dizziness [Confusion] : no confusion was observed [FreeTextEntry9] : back pain

## 2022-03-04 RX ORDER — METOPROLOL TARTRATE 50 MG
1 TABLET ORAL
Qty: 30 | Refills: 0
Start: 2022-03-04 | End: 2022-04-02

## 2022-04-19 ENCOUNTER — APPOINTMENT (OUTPATIENT)
Dept: HEART AND VASCULAR | Facility: CLINIC | Age: 82
End: 2022-04-19
Payer: MEDICARE

## 2022-04-19 VITALS
SYSTOLIC BLOOD PRESSURE: 127 MMHG | BODY MASS INDEX: 25.62 KG/M2 | OXYGEN SATURATION: 93 % | TEMPERATURE: 97.2 F | WEIGHT: 179 LBS | DIASTOLIC BLOOD PRESSURE: 78 MMHG | HEART RATE: 63 BPM | HEIGHT: 70 IN

## 2022-04-19 PROBLEM — A80.9 ACUTE POLIOMYELITIS, UNSPECIFIED: Chronic | Status: ACTIVE | Noted: 2022-01-27

## 2022-04-19 PROBLEM — E78.5 HYPERLIPIDEMIA, UNSPECIFIED: Chronic | Status: ACTIVE | Noted: 2022-01-27

## 2022-04-19 PROCEDURE — 99214 OFFICE O/P EST MOD 30 MIN: CPT

## 2022-04-26 NOTE — REASON FOR VISIT
[Arrhythmia/ECG Abnorrmalities] : arrhythmia/ECG abnormalities [FreeTextEntry1] : 82 yo Male with Pmhx of Afib being seen for Follow up

## 2022-04-26 NOTE — HISTORY OF PRESENT ILLNESS
[FreeTextEntry1] : 80 yo Male w/ Pmhx of AFib, recently diagnosed after ER visit at [facility] for Afib w/ RVR, with spontaneous cardioversion, being seen for follow up.\par \par Today patient reports feeling well. He notes chronic back pain and recent episodes of palpitations. Patient has not been adherent with his medication regimen, states he ran out of his Toprol and did not  refills. Otherwise he denies chest pain, dizziness, orthopnea or platypnea.

## 2022-04-26 NOTE — DISCUSSION/SUMMARY
[FreeTextEntry1] : 82 yo Male with Pmhx of Afib being seen for Follow up\par \par 1.Paroxysmal AFib \par -Intermittent episodes of palpitations in setting of medication non adherence\par -c/w Toprol 25 mg Qd. Discussed this at length with patient\par -CHADVASC of at least 2 placing patient at high annual thromboembolic risk.Will cont with Eliquis to 2.5 BID as patient takes frequent NSAIDs for back pain. Cont PPI daily\par -Plant to switch to full dose Eliquis once patient stops NSAIDS while on his exercise program\par \par RTC in 4months, sooner if unwell

## 2022-04-26 NOTE — PHYSICAL EXAM
[Normal] : well developed, well nourished, no acute distress [Well Developed] : well developed [Well Nourished] : well nourished [No Acute Distress] : no acute distress [Normal Venous Pressure] : normal venous pressure [Normal S1, S2] : normal S1, S2 [No Murmur] : no murmur [Clear Lung Fields] : clear lung fields [Non Tender] : non-tender [No Edema] : no edema [No Focal Deficits] : no focal deficits [Alert and Oriented] : alert and oriented [de-identified] : Irregular RR

## 2022-07-04 ENCOUNTER — EMERGENCY (EMERGENCY)
Facility: HOSPITAL | Age: 82
LOS: 1 days | Discharge: HOME | End: 2022-07-04
Attending: EMERGENCY MEDICINE | Admitting: EMERGENCY MEDICINE
Payer: MEDICARE

## 2022-07-04 VITALS
DIASTOLIC BLOOD PRESSURE: 56 MMHG | HEART RATE: 119 BPM | RESPIRATION RATE: 18 BRPM | HEIGHT: 71 IN | TEMPERATURE: 98 F | SYSTOLIC BLOOD PRESSURE: 90 MMHG | OXYGEN SATURATION: 96 %

## 2022-07-04 DIAGNOSIS — E78.5 HYPERLIPIDEMIA, UNSPECIFIED: ICD-10-CM

## 2022-07-04 DIAGNOSIS — I48.91 UNSPECIFIED ATRIAL FIBRILLATION: ICD-10-CM

## 2022-07-04 DIAGNOSIS — Z79.01 LONG TERM (CURRENT) USE OF ANTICOAGULANTS: ICD-10-CM

## 2022-07-04 DIAGNOSIS — Z96.641 PRESENCE OF RIGHT ARTIFICIAL HIP JOINT: ICD-10-CM

## 2022-07-04 DIAGNOSIS — R00.2 PALPITATIONS: ICD-10-CM

## 2022-07-04 DIAGNOSIS — Z91.14 PATIENT'S OTHER NONCOMPLIANCE WITH MEDICATION REGIMEN: ICD-10-CM

## 2022-07-04 DIAGNOSIS — A80.9 ACUTE POLIOMYELITIS, UNSPECIFIED: ICD-10-CM

## 2022-07-04 DIAGNOSIS — Z20.822 CONTACT WITH AND (SUSPECTED) EXPOSURE TO COVID-19: ICD-10-CM

## 2022-07-04 DIAGNOSIS — Z96.641 PRESENCE OF RIGHT ARTIFICIAL HIP JOINT: Chronic | ICD-10-CM

## 2022-07-04 LAB
ANION GAP SERPL CALC-SCNC: 11 MMOL/L — SIGNIFICANT CHANGE UP (ref 5–17)
BUN SERPL-MCNC: 28 MG/DL — HIGH (ref 7–23)
CALCIUM SERPL-MCNC: 9 MG/DL — SIGNIFICANT CHANGE UP (ref 8.4–10.5)
CHLORIDE SERPL-SCNC: 110 MMOL/L — HIGH (ref 96–108)
CO2 SERPL-SCNC: 20 MMOL/L — LOW (ref 22–31)
CREAT SERPL-MCNC: 1.13 MG/DL — SIGNIFICANT CHANGE UP (ref 0.5–1.3)
EGFR: 66 ML/MIN/1.73M2 — SIGNIFICANT CHANGE UP
GLUCOSE SERPL-MCNC: 115 MG/DL — HIGH (ref 70–99)
HCT VFR BLD CALC: 40.1 % — SIGNIFICANT CHANGE UP (ref 39–50)
HGB BLD-MCNC: 13.3 G/DL — SIGNIFICANT CHANGE UP (ref 13–17)
MCHC RBC-ENTMCNC: 31.4 PG — SIGNIFICANT CHANGE UP (ref 27–34)
MCHC RBC-ENTMCNC: 33.2 GM/DL — SIGNIFICANT CHANGE UP (ref 32–36)
MCV RBC AUTO: 94.6 FL — SIGNIFICANT CHANGE UP (ref 80–100)
NRBC # BLD: 0 /100 WBCS — SIGNIFICANT CHANGE UP (ref 0–0)
PLATELET # BLD AUTO: 185 K/UL — SIGNIFICANT CHANGE UP (ref 150–400)
POTASSIUM SERPL-MCNC: 4 MMOL/L — SIGNIFICANT CHANGE UP (ref 3.5–5.3)
POTASSIUM SERPL-SCNC: 4 MMOL/L — SIGNIFICANT CHANGE UP (ref 3.5–5.3)
RBC # BLD: 4.24 M/UL — SIGNIFICANT CHANGE UP (ref 4.2–5.8)
RBC # FLD: 13.2 % — SIGNIFICANT CHANGE UP (ref 10.3–14.5)
SARS-COV-2 RNA SPEC QL NAA+PROBE: NEGATIVE — SIGNIFICANT CHANGE UP
SODIUM SERPL-SCNC: 141 MMOL/L — SIGNIFICANT CHANGE UP (ref 135–145)
TROPONIN T SERPL-MCNC: 0.01 NG/ML — SIGNIFICANT CHANGE UP (ref 0–0.01)
WBC # BLD: 6.41 K/UL — SIGNIFICANT CHANGE UP (ref 3.8–10.5)
WBC # FLD AUTO: 6.41 K/UL — SIGNIFICANT CHANGE UP (ref 3.8–10.5)

## 2022-07-04 PROCEDURE — 71045 X-RAY EXAM CHEST 1 VIEW: CPT | Mod: 26

## 2022-07-04 PROCEDURE — 99291 CRITICAL CARE FIRST HOUR: CPT

## 2022-07-04 PROCEDURE — 93010 ELECTROCARDIOGRAM REPORT: CPT

## 2022-07-04 RX ORDER — SODIUM CHLORIDE 9 MG/ML
1000 INJECTION INTRAMUSCULAR; INTRAVENOUS; SUBCUTANEOUS ONCE
Refills: 0 | Status: COMPLETED | OUTPATIENT
Start: 2022-07-04 | End: 2022-07-04

## 2022-07-04 RX ORDER — APIXABAN 2.5 MG/1
1 TABLET, FILM COATED ORAL
Qty: 0 | Refills: 0 | DISCHARGE

## 2022-07-04 RX ORDER — DICLOFENAC SODIUM 75 MG/1
0 TABLET, DELAYED RELEASE ORAL
Qty: 0 | Refills: 0 | DISCHARGE

## 2022-07-04 RX ORDER — METOPROLOL TARTRATE 50 MG
25 TABLET ORAL ONCE
Refills: 0 | Status: COMPLETED | OUTPATIENT
Start: 2022-07-04 | End: 2022-07-04

## 2022-07-04 RX ORDER — SIMVASTATIN 20 MG/1
1 TABLET, FILM COATED ORAL
Qty: 0 | Refills: 0 | DISCHARGE

## 2022-07-04 RX ORDER — METOPROLOL TARTRATE 50 MG
5 TABLET ORAL ONCE
Refills: 0 | Status: COMPLETED | OUTPATIENT
Start: 2022-07-04 | End: 2022-07-04

## 2022-07-04 RX ORDER — PANTOPRAZOLE SODIUM 20 MG/1
0 TABLET, DELAYED RELEASE ORAL
Qty: 0 | Refills: 0 | DISCHARGE

## 2022-07-04 RX ORDER — METOPROLOL TARTRATE 50 MG
25 TABLET ORAL ONCE
Refills: 0 | Status: DISCONTINUED | OUTPATIENT
Start: 2022-07-04 | End: 2022-07-04

## 2022-07-04 RX ADMIN — Medication 5 MILLIGRAM(S): at 22:35

## 2022-07-04 RX ADMIN — Medication 25 MILLIGRAM(S): at 23:37

## 2022-07-04 RX ADMIN — SODIUM CHLORIDE 1000 MILLILITER(S): 9 INJECTION INTRAMUSCULAR; INTRAVENOUS; SUBCUTANEOUS at 22:48

## 2022-07-04 NOTE — ED ADULT TRIAGE NOTE - MEANS OF ARRIVAL
"    Assessment & Plan     Routine screening for STI (sexually transmitted infection)  Patient education provided, including expected course of illness and symptoms that may occur which would require urgent evalution.   - Chlamydia trachomatis PCR      11 minutes spent on the date of the encounter doing chart review, history and exam, documentation and further activities per the note       BMI:   Estimated body mass index is 28.3 kg/m  as calculated from the following:    Height as of 4/6/21: 5' 3.75\" (1.619 m).    Weight as of this encounter: 163 lb 9.6 oz (74.2 kg).           Return in about 6 months (around 12/29/2021) for routine testing.    Alem Nagel MD  [facility]  RELEASE RESULTS ON [employer] - DO NOT MAIL.    Víctor Yusuf is a 20 year old who presents for the following health issues     HPI     Pt is leaving out of town on Thursday and wants to be retested since the last positive testing pt reports she has not had intercourse since the last visit but wants to make sure it is cleared.  She had to take the powdered version of azithro and not the pill that she is used to. She was tested for other STIs in April (2 months ago) and was negative for all of them (except chlamydia)     She is typically asymptomatic with her chlamydia infections, and has had no ill symptoms lately.    She uses Nexplanon for pregnancy prevention.       Review of Systems   Constitutional, HEENT, cardiovascular, pulmonary, gi and gu systems are negative, except as otherwise noted.      Objective    /70   Pulse 90   Temp 98  F (36.7  C) (Tympanic)   Wt 163 lb 9.6 oz (74.2 kg)   SpO2 99%   BMI 28.30 kg/m    Body mass index is 28.3 kg/m .  Physical Exam   GENERAL: healthy, alert and no distress  MS: no gross musculoskeletal defects noted, no edema  PSYCH: mentation appears normal, affect normal/bright                "
stretcher

## 2022-07-04 NOTE — ED PROVIDER NOTE - PHYSICAL EXAMINATION
VITAL SIGNS: I have reviewed nursing notes and confirm.  CONSTITUTIONAL: Well-developed; well-nourished; in no acute distress.   SKIN:  warm and dry, no acute rash.   HEAD:  normocephalic, atraumatic.  EYES: PERRL, EOM intact; conjunctiva and sclera clear.  ENT: No nasal discharge; airway clear.   NECK: Supple; non tender.  CARD: S1, S2 normal; no murmurs, gallops, or rubs. Rapid, irregular rate and rhythm.   RESP:  Clear to auscultation b/l, no wheezes, rales or rhonchi.  ABD: Normal bowel sounds; soft; non-distended; non-tender; no guarding/ rebound.  MSK: Normal ROM. No clubbing, cyanosis or edema. no ttp bilat le  NEURO: Alert, oriented, grossly unremarkable  PSYCH: Cooperative, mood and affect appropriate.

## 2022-07-04 NOTE — ED PROVIDER NOTE - OBJECTIVE STATEMENT
80 yo male (FH CAD - brother had MI in his 60s), h/o polio (at age 6 with multiple ortho surgeries), hyperlipidemia, afib on eliquis, toprol XL 25 mg QD c/o 80 yo male (FH CAD - brother had MI in his 60s), h/o polio (at age 6 with multiple ortho surgeries), hyperlipidemia, afib on eliquis, toprol XL 25 mg QD c/o acute onset of lh sensation and brief palpitation sensation (now resolved) tonight ~ 1 h ago.  Pt checked his hr and saw it was  so came to ed after monitoring at home for ~ 20 min w/o change.  No sob, le pain/swelling.  No recent exertional cp/sob.  No ha, change in vision/speech/gait, numbness or weakness in ext.  Pt reports compliance w meds. 82 yo male (FH CAD - brother had MI in his 60s), h/o polio (at age 6 with multiple ortho surgeries), hyperlipidemia, afib on eliquis, toprol XL 25 mg QD c/o acute onset of lh sensation and brief palpitation sensation (now resolved) tonight ~ 1 h ago.  Pt checked his hr and saw it was  so came to ed after monitoring at home for ~ 20 min w/o change.  No sob, le pain/swelling.  No recent exertional cp/sob.  No ha, change in vision/speech/gait, numbness or weakness in ext.  Pt reports compliance w meds.    Echo 1/22:  CONCLUSIONS:     1. Borderline normal left ventricular systolic function.   2. Right ventricle is not well visualized. The right ventricle is probably dilated and appears borderline normal in function.   3. Mild mitral regurgitation.   4. Pulmonary artery systolic pressure is 33 mmHg.   5. No pericardial effusion.   6. Aortic root is mildly dilated measuring 3.80 cm. Proximal ascending aorta is mildly dilated measuring 3.70 cm.   7. No prior echo is available for comparison.

## 2022-07-04 NOTE — ED PROVIDER NOTE - PATIENT PORTAL LINK FT
You can access the FollowMyHealth Patient Portal offered by [facility] by registering at the following website: [email or web address]. By joining [employer]’s FollowMyHealth portal, you will also be able to view your health information using other applications (apps) compatible with our system.

## 2022-07-04 NOTE — ED PROVIDER NOTE - NSFOLLOWUPINSTRUCTIONS_ED_ALL_ED_FT
Rapid atrial fibrillation    Please increase your metoprolol - take 1 tab TWICE a day instead of once a day; continue your other medications as before.    Return for increased pain, difficulty breathing, palpitations, worsening lightheadedness, any other concerns.     Follow up with Dr Christensen - call his office tomorrow to make an appointment.    Atrial Fibrillation    Atrial fibrillation is a type of irregular heartbeat (arrhythmia) where the heart quivers continuously in a chaotic pattern that makes the heart unable to pump blood normally. This can increase the risk for stroke, heart failure, and other heart-related conditions. Atrial fibrillation can be caused by a variety of conditions and may be temporary, intermittent, or permanent. Symptoms include feeling that your heart is beating rapidly or irregularly, chest discomfort, shortness of breath, or dizziness/lightheadedness that may be worse with exertion. Treatment is varied but may involve medication or electrical shock (cardioversion).    SEEK IMMEDIATE MEDICAL CARE IF YOU HAVE ANY OF THE FOLLOWING SYMPTOMS: chest pain, shortness of breath, abdominal pain, sweating, vomiting, blood in vomit/bowel movements/urine, dizziness/lightheadedness, weakness or numbness to face/arm/leg, trouble speaking or understanding, facial droop.

## 2022-07-04 NOTE — ED PROVIDER NOTE - CLINICAL SUMMARY MEDICAL DECISION MAKING FREE TEXT BOX
Pt c/o resolved palpitations, lh sensation, rapid hr at home similar to prior afib w rapid afib on ekg and monitor.  BP in triage slightly low, sbp 122 on my initial eval.  Plan labs, rate control w metoprolol, ivf; reassess.  Pt on ac.

## 2022-07-04 NOTE — ED ADULT NURSE NOTE - OBJECTIVE STATEMENT
Pt is a 81yoM with hx of htn, afib (on eliquis) presenting to the ED for dizziness and palpitations. Pt is axox3, GCS 15, ambulatory. Pt spont unlabored breathing on RA. Denies CP/SOB at this time. Pt states that he was walking his dog when he had sudden feelings of dizziness, pt then checked his HR and noticed that it was around 120s. Endorsing compliance with meds, denies falls, no nausea/vomiting. Denies HA/vision changes. Placed on CM for full assessment.

## 2022-07-04 NOTE — ED PROVIDER NOTE - PROGRESS NOTE DETAILS
HR improved .  Pt feeling well.  Labs wnl.  Will give po and try to discuss w pt's cardiologist, Dr Christensen. Labs, cxr wnl.  Pt discussed w Dr Christensen - recommends increasing to metoprolol xl 25 bid and fu in office if rate remains controlled.  He is not concerned for acs and did not feel pt needed rpt trop. Pt feeling well.  No dizziness. HR 's.  Will dc.

## 2022-07-04 NOTE — ED PROVIDER NOTE - CARE PROVIDER_API CALL
Teo Christensen)  Internal Medicine  [address] [place] [address]  Phone: [phone]  Fax: [phone]  Follow Up Time:

## 2022-07-05 ENCOUNTER — NON-APPOINTMENT (OUTPATIENT)
Age: 82
End: 2022-07-05

## 2022-07-05 ENCOUNTER — APPOINTMENT (OUTPATIENT)
Dept: HEART AND VASCULAR | Facility: CLINIC | Age: 82
End: 2022-07-05

## 2022-07-05 VITALS
SYSTOLIC BLOOD PRESSURE: 100 MMHG | OXYGEN SATURATION: 100 % | TEMPERATURE: 98 F | DIASTOLIC BLOOD PRESSURE: 72 MMHG | HEART RATE: 98 BPM | RESPIRATION RATE: 17 BRPM

## 2022-07-05 VITALS
WEIGHT: 183 LBS | HEIGHT: 70 IN | DIASTOLIC BLOOD PRESSURE: 70 MMHG | SYSTOLIC BLOOD PRESSURE: 110 MMHG | TEMPERATURE: 97.5 F | BODY MASS INDEX: 26.2 KG/M2 | HEART RATE: 62 BPM | OXYGEN SATURATION: 96 %

## 2022-07-05 PROCEDURE — 84484 ASSAY OF TROPONIN QUANT: CPT

## 2022-07-05 PROCEDURE — 71045 X-RAY EXAM CHEST 1 VIEW: CPT

## 2022-07-05 PROCEDURE — 36415 COLL VENOUS BLD VENIPUNCTURE: CPT

## 2022-07-05 PROCEDURE — 80048 BASIC METABOLIC PNL TOTAL CA: CPT

## 2022-07-05 PROCEDURE — 87635 SARS-COV-2 COVID-19 AMP PRB: CPT

## 2022-07-05 PROCEDURE — 99214 OFFICE O/P EST MOD 30 MIN: CPT

## 2022-07-05 PROCEDURE — 99291 CRITICAL CARE FIRST HOUR: CPT | Mod: 25

## 2022-07-05 PROCEDURE — 85027 COMPLETE CBC AUTOMATED: CPT

## 2022-07-05 PROCEDURE — 83735 ASSAY OF MAGNESIUM: CPT

## 2022-07-05 PROCEDURE — 96361 HYDRATE IV INFUSION ADD-ON: CPT

## 2022-07-05 PROCEDURE — 93000 ELECTROCARDIOGRAM COMPLETE: CPT

## 2022-07-05 PROCEDURE — 96360 HYDRATION IV INFUSION INIT: CPT

## 2022-07-05 PROCEDURE — 93005 ELECTROCARDIOGRAM TRACING: CPT

## 2022-07-05 RX ORDER — METOPROLOL TARTRATE 50 MG
2.5 TABLET ORAL ONCE
Refills: 0 | Status: COMPLETED | OUTPATIENT
Start: 2022-07-05 | End: 2022-07-05

## 2022-07-05 RX ADMIN — SODIUM CHLORIDE 1000 MILLILITER(S): 9 INJECTION INTRAMUSCULAR; INTRAVENOUS; SUBCUTANEOUS at 00:47

## 2022-07-05 RX ADMIN — Medication 2.5 MILLIGRAM(S): at 00:45

## 2022-07-05 NOTE — ED ADULT NURSE REASSESSMENT NOTE - NS ED NURSE REASSESS COMMENT FT1
Patient ambulated well without assistance, denies dizziness/HA, denies vision changes. Pt educated on return precautions, denies SOB at this time. Pt notified that he needs to f/u with PCP and cardiologist. Ambulated with partner.
Patient's HR remains elevated in high 110s to low 120s, medicated as ordered. Pt denies CP/SOB, pt is comfortable appearing, remains on full monitor.

## 2022-07-20 ENCOUNTER — NON-APPOINTMENT (OUTPATIENT)
Age: 82
End: 2022-07-20

## 2022-07-20 NOTE — HISTORY OF PRESENT ILLNESS
[FreeTextEntry1] : 81M w/ fam hx of CAD  and pmh of pAfib(on Eliquis) presents today for follow up\par \par Since his last visit he has been doing well. However on July 4th night patient had palpitations, came to ER after it didn't self-resolve, there he was found to be in AF w/ RVR. He ultimately received Lopressor x 1 and converted back to NSR, nothing else remarkable found during w/u and was discharged from ED. He denies no similar episodes since he was initially hospitalized in Jan 2022 w/ new- onset AF. He otherwise denies F/C, CP, SOB, lightheadedness, dizziness or syncope.

## 2022-07-20 NOTE — DISCUSSION/SUMMARY
[FreeTextEntry1] : -pAFib - Currently in NSR and predominantly in NSR; Pt is symptomatic w/ AF w/ RVR when episodes occur. Will c/w Toprol 25 daily, c/w Eliquis 2.5 BID. Instructed patient to take an extra Toprol 25 x 1 if he has persistent AF. We also discussed other rhythm control strategies like Cryoablation. Though given how infrequent these episodes are he prefers to c/w current medical therapy before pursuing invasive options.\par -RTC in 3 months

## 2022-08-23 ENCOUNTER — APPOINTMENT (OUTPATIENT)
Dept: HEART AND VASCULAR | Facility: CLINIC | Age: 82
End: 2022-08-23

## 2022-08-23 VITALS
WEIGHT: 188 LBS | HEART RATE: 63 BPM | OXYGEN SATURATION: 96 % | HEIGHT: 70 IN | BODY MASS INDEX: 26.92 KG/M2 | DIASTOLIC BLOOD PRESSURE: 81 MMHG | SYSTOLIC BLOOD PRESSURE: 123 MMHG

## 2022-08-23 PROBLEM — I48.91 UNSPECIFIED ATRIAL FIBRILLATION: Chronic | Status: ACTIVE | Noted: 2022-07-04

## 2022-08-23 PROCEDURE — 99214 OFFICE O/P EST MOD 30 MIN: CPT

## 2022-08-29 NOTE — HISTORY OF PRESENT ILLNESS
[FreeTextEntry1] : 82M w/ fam hx of CAD  and pmh of pAfib(on Eliquis) presents today for follow up\par \par Since his last visit he has been doing well. At his last visit we discussed a recent ER visit on 7/4 for palpitation found to be in AF w/ RVR was given Lopressor and subsequently converted back to NSR and was discharged from the ER. \par \par He has had no repeat episode of palpitations. He otherwise denies any CP, SOB, NVD, dysuria, lightheadedness or syncope. He is going to [place] for 1 month and wants to ensure he has all of his medications to make it thru the trip

## 2022-08-29 NOTE — DISCUSSION/SUMMARY
[FreeTextEntry1] : -pAFib - Currently in NSR and predominantly in NSR; c/w Toprol 25 daily c/w Eliquis 2.5 BID; c/w PO PPI\par -All meds refilled at this visit\par -RTC in 3-4 months

## 2022-12-20 ENCOUNTER — APPOINTMENT (OUTPATIENT)
Dept: HEART AND VASCULAR | Facility: CLINIC | Age: 82
End: 2022-12-20

## 2022-12-20 VITALS
HEART RATE: 63 BPM | HEIGHT: 70 IN | SYSTOLIC BLOOD PRESSURE: 122 MMHG | WEIGHT: 178 LBS | DIASTOLIC BLOOD PRESSURE: 80 MMHG | BODY MASS INDEX: 25.48 KG/M2 | OXYGEN SATURATION: 96 % | TEMPERATURE: 97.3 F

## 2022-12-20 PROCEDURE — 99215 OFFICE O/P EST HI 40 MIN: CPT

## 2022-12-29 NOTE — REASON FOR VISIT
[FreeTextEntry1] : 82 year old male with history of CAD, afib, presents for follow up. Patient states that he has been taking his metoprolol 25mg twice a day every day. Denies any headache, dizziness, palpitations, chest pain, dyspnea. States that he has also been taking his eliquis but now is taking 5mg BID. He did have some hemorrhoidal bleeding earlier this year for which he saw a GI at [place] -- bleeding was mild and managed medically. Otherwise denies any other symptoms.

## 2022-12-29 NOTE — ASSESSMENT
[FreeTextEntry1] : 82 year old male with history of CAD, afib, presents for follow up.\par \par #Atrial fibrillation\par Patient with history of afib, now in sinus rhythm. Denies any palpitations, chest pain, dyspnea. \par - Patient given prescription of eliquis 2.5mg BID (was dose reduced given prior hemorrhoidal bleeding)\par - Patient given prescription of metoprolol 25mg once a day and told to take extra 25mg if having palpitations\par \par #CAD\par - Continue simvastatin\par \par \par RTC in 4 months for follow up

## 2023-04-11 ENCOUNTER — NON-APPOINTMENT (OUTPATIENT)
Age: 83
End: 2023-04-11

## 2023-04-11 ENCOUNTER — APPOINTMENT (OUTPATIENT)
Dept: HEART AND VASCULAR | Facility: CLINIC | Age: 83
End: 2023-04-11
Payer: MEDICARE

## 2023-04-11 VITALS
SYSTOLIC BLOOD PRESSURE: 138 MMHG | HEIGHT: 70 IN | DIASTOLIC BLOOD PRESSURE: 72 MMHG | HEART RATE: 55 BPM | OXYGEN SATURATION: 94 % | BODY MASS INDEX: 26.34 KG/M2 | WEIGHT: 184 LBS

## 2023-04-11 DIAGNOSIS — Z86.79 PERSONAL HISTORY OF OTHER DISEASES OF THE CIRCULATORY SYSTEM: ICD-10-CM

## 2023-04-11 DIAGNOSIS — N40.0 BENIGN PROSTATIC HYPERPLASIA WITHOUT LOWER URINARY TRACT SYMPMS: ICD-10-CM

## 2023-04-11 PROCEDURE — 99214 OFFICE O/P EST MOD 30 MIN: CPT

## 2023-04-11 PROCEDURE — 93000 ELECTROCARDIOGRAM COMPLETE: CPT

## 2023-05-01 NOTE — HISTORY OF PRESENT ILLNESS
[FreeTextEntry1] : 82 year old male with CAD, afib, presents for follow up. Since last visit, patient did have one symptomatic episode of what he thought was rapid afib. He discussed with our office and he has begun taking metoprolol succinate 25mg BID. Since then, he has not had any more episodes. States that he is otherwise active and works out and also endorses healthy eating. Denies any chest pain, dyspnea, lower extremity swelling or pain, orthopnea, fevers, abd pain.

## 2023-05-01 NOTE — DISCUSSION/SUMMARY
[EKG obtained to assist in diagnosis and management of assessed problem(s)] : EKG obtained to assist in diagnosis and management of assessed problem(s) [FreeTextEntry1] : 82 year old male with CAD, afib, presents for follow up.\par \par #Atrial fibrillation\par  - Patient tolerating metoprolol succinate 25mg BID without adverse symptoms. EKG with NSR with mild bradycardia, improves with activity. \par  - Continue low dose eliquis 2.5mg BID (hx of hemorrhoidal bleeding)\par \par #CAD\par  - Continue simvastatin\par \par \par RTC in 6 months

## 2023-08-08 ENCOUNTER — APPOINTMENT (OUTPATIENT)
Dept: HEART AND VASCULAR | Facility: CLINIC | Age: 83
End: 2023-08-08
Payer: MEDICARE

## 2023-08-08 VITALS
HEART RATE: 40 BPM | HEIGHT: 70 IN | OXYGEN SATURATION: 97 % | WEIGHT: 188.13 LBS | DIASTOLIC BLOOD PRESSURE: 71 MMHG | BODY MASS INDEX: 26.93 KG/M2 | SYSTOLIC BLOOD PRESSURE: 124 MMHG

## 2023-08-08 DIAGNOSIS — I25.10 ATHEROSCLEROTIC HEART DISEASE OF NATIVE CORONARY ARTERY W/OUT ANGINA PECTORIS: ICD-10-CM

## 2023-08-08 PROCEDURE — 99214 OFFICE O/P EST MOD 30 MIN: CPT

## 2023-08-08 RX ORDER — SIMVASTATIN 20 MG/1
20 TABLET, FILM COATED ORAL DAILY
Refills: 0 | Status: ACTIVE | COMMUNITY

## 2023-08-28 ENCOUNTER — APPOINTMENT (OUTPATIENT)
Dept: UROLOGY | Facility: CLINIC | Age: 83
End: 2023-08-28

## 2023-09-19 PROBLEM — I25.10 CORONARY ARTERY DISEASE: Status: ACTIVE | Noted: 2023-04-11

## 2023-10-17 RX ORDER — APIXABAN 2.5 MG/1
2.5 TABLET, FILM COATED ORAL
Qty: 120 | Refills: 5 | Status: ACTIVE | COMMUNITY
Start: 2022-02-08 | End: 1900-01-01

## 2023-10-31 RX ORDER — METOPROLOL SUCCINATE 25 MG/1
25 TABLET, EXTENDED RELEASE ORAL
Qty: 90 | Refills: 3 | Status: ACTIVE | COMMUNITY
Start: 2022-02-08 | End: 1900-01-01

## 2023-12-05 ENCOUNTER — APPOINTMENT (OUTPATIENT)
Dept: HEART AND VASCULAR | Facility: CLINIC | Age: 83
End: 2023-12-05
Payer: MEDICARE

## 2023-12-05 VITALS
HEIGHT: 70 IN | DIASTOLIC BLOOD PRESSURE: 78 MMHG | SYSTOLIC BLOOD PRESSURE: 152 MMHG | HEART RATE: 57 BPM | WEIGHT: 189 LBS | BODY MASS INDEX: 27.06 KG/M2 | OXYGEN SATURATION: 98 %

## 2023-12-05 PROCEDURE — 99214 OFFICE O/P EST MOD 30 MIN: CPT

## 2023-12-05 RX ORDER — NAPROXEN 500 MG/1
TABLET ORAL
Refills: 0 | Status: ACTIVE | COMMUNITY

## 2023-12-05 RX ORDER — DOCUSATE SODIUM 100 MG/1
100 CAPSULE ORAL
Refills: 0 | Status: ACTIVE | COMMUNITY

## 2023-12-05 RX ORDER — MULTIVIT-MINS/IRON/FOLIC/LYCOP 8-200-600
TABLET ORAL
Refills: 0 | Status: ACTIVE | COMMUNITY

## 2024-02-26 NOTE — H&P ADULT - NEUROLOGICAL
Monitor placed by [employer] RN  Monitor company [employer]  Length of monitor 28 day  Monitor ordered by [facility]  Serial number [id number]  Kit ID [id number], [id number], [id number]  Activation successful prior to pt leaving office? Yes     
Alert & oriented; no sensory, motor or coordination deficits, normal reflexes

## 2024-04-08 ENCOUNTER — NON-APPOINTMENT (OUTPATIENT)
Age: 84
End: 2024-04-08

## 2024-04-09 ENCOUNTER — APPOINTMENT (OUTPATIENT)
Dept: HEART AND VASCULAR | Facility: CLINIC | Age: 84
End: 2024-04-09
Payer: MEDICARE

## 2024-04-09 VITALS
SYSTOLIC BLOOD PRESSURE: 137 MMHG | HEIGHT: 70 IN | BODY MASS INDEX: 27.2 KG/M2 | OXYGEN SATURATION: 97 % | HEART RATE: 59 BPM | WEIGHT: 190 LBS | DIASTOLIC BLOOD PRESSURE: 79 MMHG

## 2024-04-09 DIAGNOSIS — I48.0 PAROXYSMAL ATRIAL FIBRILLATION: ICD-10-CM

## 2024-04-09 PROCEDURE — 99214 OFFICE O/P EST MOD 30 MIN: CPT

## 2024-04-09 RX ORDER — PANTOPRAZOLE 20 MG/1
20 TABLET, DELAYED RELEASE ORAL DAILY
Qty: 90 | Refills: 3 | Status: ACTIVE | COMMUNITY
Start: 2022-04-19 | End: 1900-01-01

## 2024-04-10 PROBLEM — I48.0 PAROXYSMAL ATRIAL FIBRILLATION: Status: ACTIVE | Noted: 2022-02-08

## 2024-04-10 NOTE — ASSESSMENT
[FreeTextEntry1] : -pAFib - Pt. remains in NSR w/ low AF burden; c/w Toprol 25 daily, c/w Eliquis 2.5 BID(reduced dose d/t GI upset/gastritis). Pt.'s episode at the gym does not seem related to Afib. Pt. frequently works out on empty stomach. I advised patient to stay hydrated prior to exercising. -c/w PPI  RTC in 4 months

## 2024-04-10 NOTE — HISTORY OF PRESENT ILLNESS
[FreeTextEntry1] : 83M w/ pmh of pAF(on Eliquis) presents today for routine f/u.  Mr. Benson continues to do well . He does note an episode at the gym ~2 weeks ago where he became lightheaded and fatigued towards the end of his workout on the elliptical. He was able to walk home under his own power. He subsequently went back to the gym a week later without issue. He otherwise denies any episodes of CP, SOB, Palpitations, NVD, dysuria or syncope.

## 2024-08-06 ENCOUNTER — APPOINTMENT (OUTPATIENT)
Dept: HEART AND VASCULAR | Facility: CLINIC | Age: 84
End: 2024-08-06

## 2024-08-12 ENCOUNTER — NON-APPOINTMENT (OUTPATIENT)
Age: 84
End: 2024-08-12

## 2024-08-13 ENCOUNTER — APPOINTMENT (OUTPATIENT)
Dept: HEART AND VASCULAR | Facility: CLINIC | Age: 84
End: 2024-08-13
Payer: MEDICARE

## 2024-08-13 VITALS
WEIGHT: 190 LBS | HEIGHT: 70 IN | OXYGEN SATURATION: 98 % | BODY MASS INDEX: 27.2 KG/M2 | HEART RATE: 48 BPM | DIASTOLIC BLOOD PRESSURE: 73 MMHG | SYSTOLIC BLOOD PRESSURE: 136 MMHG

## 2024-08-13 DIAGNOSIS — I48.0 PAROXYSMAL ATRIAL FIBRILLATION: ICD-10-CM

## 2024-08-13 PROCEDURE — 99203 OFFICE O/P NEW LOW 30 MIN: CPT

## 2024-08-13 RX ORDER — VIBEGRON 75 MG/1
75 TABLET, FILM COATED ORAL
Refills: 0 | Status: ACTIVE | COMMUNITY

## 2024-08-13 NOTE — REVIEW OF SYSTEMS
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 It is excreted in breast milk.

## 2024-08-13 NOTE — HISTORY OF PRESENT ILLNESS
[FreeTextEntry1] : 82 y/o M with a hx of PAFib (On Eliquis and BB), who presents for follow up. Previously followed with Dr. Christensen, is new to me today.   EKG today demonstrates rate controlled atrial fibrillation.  Denies CP, SOB, orthopnea, PND, LE edema, palpitations, syncope. Occasionally does have lightheaded spells after long session on elliptical (which he does 3x/week), no falls, no syncope.   Last TTE 1/28/2022 - Low normal LVEF 50%, no RWMA, RV mildly dilated normal function, normal valvular function   SocHx: 2 younger brothers (both with AFib), mother had CHF in 90s. Children live in [place]. Patient himself is retired  (prior  of [employer] and [employer])

## 2024-08-13 NOTE — ASSESSMENT
[FreeTextEntry1] : 82 y/o M with a hx of PAFib (On Eliquis), who presents for follow up.  Rate controlled AFib, on AC - [id number] 2 - continue reduced dose Eliquis 2.5mg BID - C/w Metoprolol 25 mg qD - C/w Protonix for GI protection while on DOAC - Repeat TTE given lightheaded spells and prior low-normal EF   Follow up in 6 months

## 2024-08-13 NOTE — PHYSICAL EXAM

## 2024-10-01 ENCOUNTER — RESULT REVIEW (OUTPATIENT)
Age: 84
End: 2024-10-01

## 2024-10-01 ENCOUNTER — OUTPATIENT (OUTPATIENT)
Dept: OUTPATIENT SERVICES | Facility: HOSPITAL | Age: 84
LOS: 1 days | End: 2024-10-01
Payer: MEDICARE

## 2024-10-01 DIAGNOSIS — Z96.641 PRESENCE OF RIGHT ARTIFICIAL HIP JOINT: Chronic | ICD-10-CM

## 2024-10-01 DIAGNOSIS — I48.0 PAROXYSMAL ATRIAL FIBRILLATION: ICD-10-CM

## 2024-10-01 PROCEDURE — 93306 TTE W/DOPPLER COMPLETE: CPT | Mod: 26

## 2024-11-20 PROCEDURE — C8929: CPT

## 2025-01-12 ENCOUNTER — EMERGENCY (EMERGENCY)
Facility: HOSPITAL | Age: 85
LOS: 1 days | Discharge: HOME | End: 2025-01-12
Attending: STUDENT IN AN ORGANIZED HEALTH CARE EDUCATION/TRAINING PROGRAM | Admitting: STUDENT IN AN ORGANIZED HEALTH CARE EDUCATION/TRAINING PROGRAM
Payer: MEDICARE

## 2025-01-12 VITALS
WEIGHT: 184.97 LBS | HEIGHT: 70 IN | DIASTOLIC BLOOD PRESSURE: 86 MMHG | RESPIRATION RATE: 16 BRPM | OXYGEN SATURATION: 97 % | SYSTOLIC BLOOD PRESSURE: 128 MMHG | TEMPERATURE: 98 F | HEART RATE: 77 BPM

## 2025-01-12 VITALS
OXYGEN SATURATION: 97 % | SYSTOLIC BLOOD PRESSURE: 158 MMHG | DIASTOLIC BLOOD PRESSURE: 91 MMHG | RESPIRATION RATE: 16 BRPM | HEART RATE: 86 BPM

## 2025-01-12 DIAGNOSIS — Z96.641 PRESENCE OF RIGHT ARTIFICIAL HIP JOINT: Chronic | ICD-10-CM

## 2025-01-12 LAB
ANION GAP SERPL CALC-SCNC: 12 MMOL/L — SIGNIFICANT CHANGE UP (ref 5–17)
BUN SERPL-MCNC: 22 MG/DL — SIGNIFICANT CHANGE UP (ref 7–23)
CALCIUM SERPL-MCNC: 9.3 MG/DL — SIGNIFICANT CHANGE UP (ref 8.4–10.5)
CHLORIDE SERPL-SCNC: 107 MMOL/L — SIGNIFICANT CHANGE UP (ref 96–108)
CO2 SERPL-SCNC: 23 MMOL/L — SIGNIFICANT CHANGE UP (ref 22–31)
CREAT SERPL-MCNC: 0.92 MG/DL — SIGNIFICANT CHANGE UP (ref 0.5–1.3)
EGFR: 83 ML/MIN/1.73M2 — SIGNIFICANT CHANGE UP
GLUCOSE SERPL-MCNC: 98 MG/DL — SIGNIFICANT CHANGE UP (ref 70–99)
HCT VFR BLD CALC: 41.1 % — SIGNIFICANT CHANGE UP (ref 39–50)
HGB BLD-MCNC: 13.6 G/DL — SIGNIFICANT CHANGE UP (ref 13–17)
MCHC RBC-ENTMCNC: 31.2 PG — SIGNIFICANT CHANGE UP (ref 27–34)
MCHC RBC-ENTMCNC: 33.1 G/DL — SIGNIFICANT CHANGE UP (ref 32–36)
MCV RBC AUTO: 94.3 FL — SIGNIFICANT CHANGE UP (ref 80–100)
NRBC # BLD: 0 /100 WBCS — SIGNIFICANT CHANGE UP (ref 0–0)
PLATELET # BLD AUTO: 221 K/UL — SIGNIFICANT CHANGE UP (ref 150–400)
POTASSIUM SERPL-MCNC: 3.8 MMOL/L — SIGNIFICANT CHANGE UP (ref 3.5–5.3)
POTASSIUM SERPL-SCNC: 3.8 MMOL/L — SIGNIFICANT CHANGE UP (ref 3.5–5.3)
RBC # BLD: 4.36 M/UL — SIGNIFICANT CHANGE UP (ref 4.2–5.8)
RBC # FLD: 12.9 % — SIGNIFICANT CHANGE UP (ref 10.3–14.5)
SODIUM SERPL-SCNC: 142 MMOL/L — SIGNIFICANT CHANGE UP (ref 135–145)
TROPONIN T, HIGH SENSITIVITY RESULT: 18 NG/L — SIGNIFICANT CHANGE UP (ref 0–51)
TROPONIN T, HIGH SENSITIVITY RESULT: 21 NG/L — SIGNIFICANT CHANGE UP (ref 0–51)
WBC # BLD: 6.98 K/UL — SIGNIFICANT CHANGE UP (ref 3.8–10.5)
WBC # FLD AUTO: 6.98 K/UL — SIGNIFICANT CHANGE UP (ref 3.8–10.5)

## 2025-01-12 PROCEDURE — 99284 EMERGENCY DEPT VISIT MOD MDM: CPT

## 2025-01-12 PROCEDURE — 36000 PLACE NEEDLE IN VEIN: CPT

## 2025-01-12 PROCEDURE — 99283 EMERGENCY DEPT VISIT LOW MDM: CPT

## 2025-01-12 RX ORDER — SODIUM CHLORIDE 9 MG/ML
1000 INJECTION, SOLUTION INTRAMUSCULAR; INTRAVENOUS; SUBCUTANEOUS ONCE
Refills: 0 | Status: COMPLETED | OUTPATIENT
Start: 2025-01-12 | End: 2025-01-12

## 2025-01-12 RX ADMIN — SODIUM CHLORIDE 1000 MILLILITER(S): 9 INJECTION, SOLUTION INTRAMUSCULAR; INTRAVENOUS; SUBCUTANEOUS at 12:53

## 2025-01-12 NOTE — ED PROVIDER NOTE - PATIENT PORTAL LINK FT
You can access the FollowMyHealth Patient Portal offered by [facility] by registering at the following website: [email or web address]. By joining [employer]’s FollowMyHealth portal, you will also be able to view your health information using other applications (apps) compatible with our system.

## 2025-01-12 NOTE — ED PROVIDER NOTE - CLINICAL SUMMARY MEDICAL DECISION MAKING FREE TEXT BOX
84F hx of afib on eliquis here for feeling light headed and near syncopal after leaving gym   PTA. Now feeling improved. Denies cp, sob, weakness, numbness, slurred speech.    Trops stable, not rapidly uptrending EKG without acute ischemic changes. Feeling improved after IVF. Will dc.

## 2025-01-12 NOTE — ED ADULT TRIAGE NOTE - BMI (KG/M2)
Pt admitted to PNR for induction of labor. Plan of care explained. Room set up described. Dr. Kennedy called at 0910 and informed of pt's admission and requests.       Upon admission to the unit, nurse discussed:    General unit guidelines:  Meals/room service   Visitors hours/quite time  Sleeping arrangements (only 2 visitors can spend the night)  Children visiting- Another adult must be responsible for any children visiting  All visitors must sign in and wear a visitor pass sticker at all times  Pantry- patient use only  Welcome binder  Discharge binders  Reviewed \"Keeping your baby safe while in the hospital\" information sheet and left copy at bedside  2 hour rounding  Reviewed Fall/Safety Information for mom and baby:  · The safest place for your baby is in his/her crib  · If you are feeling tired, please place your baby in his/her bassinet before falling a sleep  · Please call for help before you get out of bed; until your Nurse states you can get up on your own  · Made aware of narcotic side effects that may increase risk of patient and infant falls    RN also covered room basics:   how to use the call bell, TV, phone, adjust temperature in room, and cell phone use.    Pain:  At admission the pain scale, and assessment/management of pain were explained.  Pain management options were discussed with patient.      26.5

## 2025-01-12 NOTE — ED ADULT NURSE NOTE - NSFALLHARMRISKINTERV_ED_ALL_ED

## 2025-01-12 NOTE — ED ADULT TRIAGE NOTE - CHIEF COMPLAINT QUOTE
pt BIBA c/o feeling lightheaded and almost fainted 35 min PTA, after working out in the GYM.  pt endorsed " symptoms resolved" denies any weakness, numbness, blurred vision. slurred speech. FS and ekg requested in triage. pmh A-fib on Eliquis.

## 2025-01-12 NOTE — ED PROVIDER NOTE - OBJECTIVE STATEMENT
84F hx of afib on eliquis here for feeling light headed and near syncopal after leaving    PTA. Now feeling improved. Denies cp, sob, weakness, numbness, slurred speech.

## 2025-01-12 NOTE — ED ADULT NURSE NOTE - OBJECTIVE STATEMENT
84yM pmhx Afib on Eliquis and metoprolol, HDL BIBEMS complaining of a period of lightheadedness that resolved pta. Pt states that he worked out per routine and after had a few minutes of lightheadedness. On arrival states he feels generally weak. Denies recent falls, N/V/D, F/C, SOB/CP, one sided weakness, slurred speech, room spinning, changes in vision.

## 2025-01-16 DIAGNOSIS — R55 SYNCOPE AND COLLAPSE: ICD-10-CM

## 2025-01-16 DIAGNOSIS — Z79.01 LONG TERM (CURRENT) USE OF ANTICOAGULANTS: ICD-10-CM

## 2025-01-16 DIAGNOSIS — R42 DIZZINESS AND GIDDINESS: ICD-10-CM

## 2025-01-16 DIAGNOSIS — I48.91 UNSPECIFIED ATRIAL FIBRILLATION: ICD-10-CM

## 2025-02-11 ENCOUNTER — APPOINTMENT (OUTPATIENT)
Dept: HEART AND VASCULAR | Facility: CLINIC | Age: 85
End: 2025-02-11
Payer: MEDICARE

## 2025-02-11 ENCOUNTER — NON-APPOINTMENT (OUTPATIENT)
Age: 85
End: 2025-02-11

## 2025-02-11 VITALS
BODY MASS INDEX: 26.77 KG/M2 | HEART RATE: 70 BPM | WEIGHT: 187 LBS | OXYGEN SATURATION: 97 % | DIASTOLIC BLOOD PRESSURE: 76 MMHG | SYSTOLIC BLOOD PRESSURE: 130 MMHG | HEIGHT: 70 IN

## 2025-02-11 VITALS — DIASTOLIC BLOOD PRESSURE: 75 MMHG | SYSTOLIC BLOOD PRESSURE: 121 MMHG

## 2025-02-11 DIAGNOSIS — I48.0 PAROXYSMAL ATRIAL FIBRILLATION: ICD-10-CM

## 2025-02-11 DIAGNOSIS — I25.10 ATHEROSCLEROTIC HEART DISEASE OF NATIVE CORONARY ARTERY W/OUT ANGINA PECTORIS: ICD-10-CM

## 2025-02-11 PROCEDURE — 99214 OFFICE O/P EST MOD 30 MIN: CPT

## 2025-03-01 ENCOUNTER — EMERGENCY (EMERGENCY)
Facility: HOSPITAL | Age: 85
LOS: 1 days | Discharge: HOME | End: 2025-03-01
Attending: EMERGENCY MEDICINE | Admitting: EMERGENCY MEDICINE
Payer: MEDICARE

## 2025-03-01 VITALS
RESPIRATION RATE: 18 BRPM | HEART RATE: 59 BPM | SYSTOLIC BLOOD PRESSURE: 114 MMHG | TEMPERATURE: 98 F | DIASTOLIC BLOOD PRESSURE: 71 MMHG | OXYGEN SATURATION: 96 %

## 2025-03-01 VITALS
DIASTOLIC BLOOD PRESSURE: 89 MMHG | HEIGHT: 70 IN | TEMPERATURE: 98 F | RESPIRATION RATE: 18 BRPM | OXYGEN SATURATION: 95 % | SYSTOLIC BLOOD PRESSURE: 160 MMHG | WEIGHT: 182.1 LBS | HEART RATE: 60 BPM

## 2025-03-01 DIAGNOSIS — Z96.641 PRESENCE OF RIGHT ARTIFICIAL HIP JOINT: Chronic | ICD-10-CM

## 2025-03-01 PROCEDURE — 70450 CT HEAD/BRAIN W/O DYE: CPT | Mod: MC

## 2025-03-01 PROCEDURE — 70450 CT HEAD/BRAIN W/O DYE: CPT | Mod: 26

## 2025-03-01 PROCEDURE — 99284 EMERGENCY DEPT VISIT MOD MDM: CPT | Mod: 25

## 2025-03-01 PROCEDURE — 99284 EMERGENCY DEPT VISIT MOD MDM: CPT

## 2025-03-01 NOTE — ED ADULT NURSE NOTE - CAS TRG GENERAL AIRWAY, MLM
[address] [address] [address] [facility] [address] [facility]  Dept: [phone]  Dept Fax: [phone]  Loc: [phone]    Visit Date: 7/13/2021    Urban Brothers is a 57 y.o. male who presents todayfor:  Chief Complaint   Patient presents with    Check-Up    Coronary Artery Disease    Hypertension    Hyperlipidemia   not seen in a while  NSTEMI 2018   Failed to follow up   Took meds for only few months or so   Was not on meds for a while  Lately was in the ER for chest pain  Few episodes  Stress test   No obvious ischemia   Lower EF   Echo with EF 50  Back on his meds  Bp is better  Still higher today   Still smoking   On statins for hyperlipidemia         HPI:  HPI  Past Medical History:   Diagnosis Date    GERD (gastroesophageal reflux disease)     Hernia of abdominal wall     Hypertension       Past Surgical History:   Procedure Laterality Date    HYDROCELE EXCISION       Family History   Problem Relation Age of Onset    Diabetes Mother     Diabetes Father      Social History     Tobacco Use    Smoking status: Current Every Day Smoker     Packs/day: 1.50     Years: 20.00     Pack years: 30.00     Types: Cigarettes    Smokeless tobacco: Never Used   Substance Use Topics    Alcohol use: Yes     Comment: Social Drinker       Current Outpatient Medications   Medication Sig Dispense Refill    nitroGLYCERIN (NITROSTAT) 0.4 MG SL tablet Place 0.4 mg under the tongue every 5 minutes as needed for Chest pain up to max of 3 total doses. If no relief after 1 dose, call 911.  metoprolol tartrate (LOPRESSOR) 25 MG tablet Take 1 tablet by mouth 2 times daily 60 tablet 3    atorvastatin (LIPITOR) 40 MG tablet Take 1 tablet by mouth nightly 30 tablet 3    aspirin EC 81 MG EC tablet Take 1 tablet by mouth daily 90 tablet 1     No current facility-administered medications for this visit.      No Known Allergies  Health Maintenance   Topic Date Due    Hepatitis C screen  Never done    Pneumococcal 0-64 years Vaccine (1 of 2 - PPSV23) Never done    COVID-19 Vaccine (1) Never done    HIV screen  Never done    A1C test (Diabetic or Prediabetic)  04/07/2019    Lipid screen  04/07/2019    Potassium monitoring  04/07/2019    Creatinine monitoring  04/07/2019    Colon cancer screen colonoscopy  Never done    Flu vaccine (1) 09/01/2021    DTaP/Tdap/Td vaccine (2 - Td or Tdap) 05/23/2023    Hepatitis A vaccine  Aged Out    Hepatitis B vaccine  Aged Out    Hib vaccine  Aged Out    Meningococcal (ACWY) vaccine  Aged Out       Subjective:  Review of Systems  General:   No fever, no chills, some fatigue or weight loss  Pulmonary:    some dyspnea, no wheezing  Cardiac:    Denies recent chest pain,   GI:     No nausea or vomiting, no abdominal pain  Neuro:     No dizziness or light headedness,   Musculoskeletal:  No recent active issues  Extremities:   No edema, no obvious claudication       Objective:  Physical Exam  BP (!) 152/96   Pulse 68   Ht 5' 9\" (1.753 m)   Wt 195 lb 12.8 oz (88.8 kg)   BMI 28.91 kg/m²   General:   Well developed, well nourished  Lungs:    Clear to auscultation  Heart:    Normal S1 S2, Slight murmur. no rubs, no gallops  Abdomen:   Soft, non tender, no organomegalies, positive bowel sounds  Extremities:   No edema, no cyanosis, good peripheral pulses  Neurological:   Awake, alert, oriented. No obvious focal deficits  Musculoskelatal:  No obvious deformities    Assessment:      Diagnosis Orders   1. Coronary artery disease involving native coronary artery of native heart with angina pectoris ([address]     2. Essential hypertension     3. Familial hypercholesterolemia     as above  Concerning risk   COMPLIANCE issues       Plan:  No follow-ups on file.   As above  Add ARB  Monitor the BP  Consider a cath if clinically indicated  Continue risk factor modification and medical management  Thank you for allowing me to participate in the care of your patient. Please don't hesitate to contact me regarding any further issues related to the patient care    Orders Placed:  No orders of the defined types were placed in this encounter. Medications Prescribed:  No orders of the defined types were placed in this encounter. Discussed use, benefit, and side effects of prescribed medications. All patient questions answered. Pt voicedunderstanding. Instructed to continue current medications, diet and exercise. Continue risk factor modification and medical management. Patient agreed with treatment plan. Follow up as directed.     Electronically signedby Lana Tristan MD on 7/13/2021 at 3:07 PM [employer]

## 2025-03-01 NOTE — ED ADULT NURSE NOTE - NSFALLHARMRISKINTERV_ED_ALL_ED
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
## 2025-03-01 NOTE — ED PROVIDER NOTE - NSFOLLOWUPINSTRUCTIONS_ED_ALL_ED_FT
Please see your primary care provider for followup.  Call for appointment.  If you have any problems with followup, please call the ED Referral Coordinator at [phone].  Return to the ER if symptoms worsen or other concerns.    Head Injury    WHAT YOU NEED TO KNOW:    A head injury is most often caused by a blow to the head. This may occur from a fall, bicycle injury, sports injury, being struck in the head, or a motor vehicle accident.     DISCHARGE INSTRUCTIONS:    Call 911 or have someone else call for any of the following:     You cannot be woken.      You have a seizure.      You stop responding to others or you faint.      You have blurry or double vision.      Your speech becomes slurred or confused.      You have arm or leg weakness, loss of feeling, or new problems with coordination.      Your pupils are larger than usual or one pupil is a different size than the other.       You have blood or clear fluid coming out of your ears or nose.    Return to the emergency department if:     You have repeated or forceful vomiting.      You feel confused.      Your headache gets worse or becomes severe.      You or someone caring for you notices that you are harder to wake than usual.    Contact your healthcare provider if:     Your symptoms last longer than 6 weeks after the injury.      You have questions or concerns about your condition or care.    Medicines:     Acetaminophen decreases pain. Acetaminophen is available without a doctor's order. Ask how much to take and how often to take it. Follow directions. Acetaminophen can cause liver damage if not taken correctly.      Take your medicine as directed. Contact your healthcare provider if you think your medicine is not helping or if you have side effects. Tell him or her if you are allergic to any medicine. Keep a list of the medicines, vitamins, and herbs you take. Include the amounts, and when and why you take them. Bring the list or the pill bottles to follow-up visits. Carry your medicine list with you in case of an emergency.    Self-care:     Rest or do quiet activities for 24 to 48 hours. Limit your time watching TV, using the computer, or doing tasks that require a lot of thinking. Slowly return to your normal activities as directed. Do not play sports or do activities that may cause you to get hit in the head. Ask your healthcare provider when you can return to sports.       Apply ice on your head for 15 to 20 minutes every hour or as directed. Use an ice pack, or put crushed ice in a plastic bag. Cover it with a towel before you apply it to your skin. Ice helps prevent tissue damage and decreases swelling and pain.       Have someone stay with you for 24 hours or as directed. This person can monitor you for complications and call 911. When you are awake the person should ask you a few questions to see if you are thinking clearly. An example would be to ask your name or your address.     Prevent another head injury:     Wear a helmet that fits properly. Do this when you play sports, or ride a bike, scooter, or skateboard. Helmets help decrease your risk of a serious head injury. Talk to your healthcare provider about other ways you can protect yourself if you play sports.      Wear your seat belt every time you are in a car. This helps to decrease your risk for a head injury if you are in a car accident.     Follow up with your healthcare provider as directed: Write down your questions so you remember to ask them during your visits.

## 2025-03-01 NOTE — ED PROVIDER NOTE - ENMT, MLM
Airway patent, Nasal mucosa clear. Mouth with normal mucosa. small scalp hematoma high occipital scalp. no lac.

## 2025-03-01 NOTE — ED PROVIDER NOTE - PATIENT PORTAL LINK FT
You can access the FollowMyHealth Patient Portal offered by [facility] by registering at the following website: [email or web address]. By joining [employer]’s FollowMyHealth portal, you will also be able to view your health information using other applications (apps) compatible with our system.

## 2025-03-01 NOTE — ED ADULT NURSE NOTE - CAS TRG GEN SKIN COLOR
History  Chief Complaint   Patient presents with    Dizziness     Pt  presents to the ED with complaints of dizziness that she awoke with  Pt  stated that she feels that she is spinning and has occasional nausea with it  History provided by:  Patient  Dizziness   Quality:  Head spinning and imbalance  Severity:  Moderate  Onset quality:  Sudden  Duration:  4 hours  Timing:  Intermittent  Progression:  Waxing and waning  Chronicity:  New  Context: bending over, eye movement, head movement and standing up    Context: not with ear pain, not with inactivity, not with loss of consciousness, not with medication, not with physical activity and not when urinating    Relieved by:  None tried  Worsened by: Movement, turning head and eye movement  Ineffective treatments:  Being still  Associated symptoms: no blood in stool, no chest pain, no diarrhea, no headaches, no hearing loss, no nausea, no palpitations, no shortness of breath, no syncope, no tinnitus, no vision changes, no vomiting and no weakness    Risk factors: multiple medications    Risk factors: no anemia, no heart disease, no hx of stroke, no hx of vertigo, no Meniere's disease and no new medications        Prior to Admission Medications   Prescriptions Last Dose Informant Patient Reported? Taking?    Melatonin ER 10 MG TBCR   Yes Yes   Sig: Take 1 tablet by mouth daily   busPIRone (BUSPAR) 7 5 mg tablet   No Yes   Sig: Take 1 tablet (7 5 mg total) by mouth 2 (two) times a day   cetirizine (ZyrTEC) 10 mg tablet   No Yes   Sig: Take 1 tablet (10 mg total) by mouth daily   cloNIDine (CATAPRES) 0 1 mg tablet   No Yes   Sig: Take 1 tab (0 1 mg) PO Q AM and 2 Tabs (for a total of 0 2 mg) PO Q PM   sertraline (ZOLOFT) 50 mg tablet   No Yes   Sig: Take 1 tablet (50 mg total) by mouth 2 (two) times a day   therapeutic multivitamin-minerals (THERAGRAN-M) tablet   Yes Yes   Sig: Take 1 tablet by mouth daily   traZODone (DESYREL) 50 mg tablet   No Yes   Sig: Take 1/2 (25 mg) to 1 (50 mg)  tab po q hs prn      Facility-Administered Medications: None       Past Medical History:   Diagnosis Date    Abnormal Pap smear of cervix     Anxiety     Depression     HPV (human papilloma virus) infection     Kidney stone     H/O PYELONEPHRITIS    Migraine     Seasonal allergies     Sleep difficulties     Varicella     POS HX       Past Surgical History:   Procedure Laterality Date    CHOLECYSTECTOMY      FINGER SURGERY      left hand     GYNECOLOGIC CRYOSURGERY      HAND SURGERY Left     Phelebitis in left hand  post IV infilitration, had sx for clot removal     LYMPH NODE BIOPSY      of neck    LYMPH NODE DISSECTION Right 2011    cervical    AL  DELIVERY ONLY N/A 2018    Procedure:  SECTION (); Surgeon: Paty Nair MD;  Location: [facility];  Service: Obstetrics       Family History   Problem Relation Age of Onset    Hypertension Mother     Heart disease Mother     Depression Mother     Coronary artery disease Mother     Hypertension Father     Hyperlipidemia Father     Heart disease Father         MI    Stroke Paternal Grandmother     Cancer Paternal Grandmother         breast    Breast cancer Paternal Grandmother     Cancer Paternal Aunt         colon    Early death Maternal Uncle     Cancer Maternal Uncle         lymphoma    Early death Maternal Uncle         lymphoma    Birth defects Maternal Uncle     Undescended testes Son     Cystic fibrosis Other     Cystic fibrosis Other     Heart failure Maternal Grandmother     Stroke Maternal Grandfather     Alcohol abuse Maternal Grandfather     Alcohol abuse Brother     Drug abuse Brother     Anxiety disorder Sister     Post-traumatic stress disorder Sister     Developmental delay Son     Macrocephaly Son     Hydrocephalus Son      I have reviewed and agree with the history as documented      Social History     Tobacco Use    Smoking status: Former Smoker Packs/day: 0 50     Years: 3 00     Pack years: 1 50     Types: Cigarettes     Last attempt to quit: 2013     Years since quittin 9    Smokeless tobacco: Never Used   Substance Use Topics    Alcohol use: Never     Frequency: Never    Drug use: No        Review of Systems   Constitutional: Positive for activity change and appetite change  Negative for chills, fatigue and fever  HENT: Negative for congestion, ear discharge, ear pain, hearing loss, postnasal drip, sore throat and tinnitus  Eyes: Negative for photophobia, pain, discharge, redness, itching and visual disturbance  Respiratory: Negative for chest tightness and shortness of breath  Cardiovascular: Negative for chest pain, palpitations and syncope  Gastrointestinal: Negative for anal bleeding, blood in stool, diarrhea, nausea and vomiting  Genitourinary: Negative for vaginal bleeding  Skin: Negative for color change, pallor and rash  Neurological: Positive for dizziness  Negative for weakness and headaches  Psychiatric/Behavioral: Negative for confusion  All other systems reviewed and are negative  Physical Exam  Physical Exam   Constitutional: She is oriented to person, place, and time  She appears well-developed and well-nourished  No distress  HENT:   Head: Normocephalic  Right Ear: External ear normal    Left Ear: External ear normal    Nose: Nose normal    Mouth/Throat: Oropharynx is clear and moist    Examination of both the tympanic membranes-no erythema  Eyes: Pupils are equal, round, and reactive to light  Conjunctivae are normal  Right eye exhibits no discharge  Left eye exhibits no discharge  Patient has horizontal nystagmus that is fatigable with looking to the right  Neck: Neck supple  No JVD present  No tracheal deviation present  No thyromegaly present  Cardiovascular: Normal rate, regular rhythm and normal heart sounds  Exam reveals no gallop and no friction rub     No murmur Normal for race heard   Pulmonary/Chest: Effort normal and breath sounds normal  No stridor  No respiratory distress  She has no wheezes  She has no rales  Musculoskeletal: She exhibits no edema  Lymphadenopathy:     She has no cervical adenopathy  Neurological: She is alert and oriented to person, place, and time  No cranial nerve deficit  Coordination abnormal    Unsteady gait   Skin: Skin is warm  Capillary refill takes less than 2 seconds  She is not diaphoretic  Psychiatric: She has a normal mood and affect  Her behavior is normal  Judgment and thought content normal    Nursing note and vitals reviewed        Vital Signs  ED Triage Vitals [12/28/19 1148]   Temperature Pulse Respirations Blood Pressure SpO2   98 8 °F (37 1 °C) 69 20 140/71 100 %      Temp Source Heart Rate Source Patient Position - Orthostatic VS BP Location FiO2 (%)   Oral Monitor Sitting Left arm --      Pain Score       No Pain           Vitals:    12/28/19 1148   BP: 140/71   Pulse: 69   Patient Position - Orthostatic VS: Sitting         Visual Acuity      ED Medications  Medications   sodium chloride 0 9 % bolus 1,000 mL (0 mL Intravenous Stopped 12/28/19 1435)   LORazepam (ATIVAN) 2 mg/mL injection 0 5 mg (0 5 mg Intravenous Given 12/28/19 1326)   diphenhydrAMINE (BENADRYL) injection 25 mg (25 mg Intravenous Given 12/28/19 1324)       Diagnostic Studies  Results Reviewed     Procedure Component Value Units Date/Time    T4, free [[id number]]     Lab Status:  No result Specimen:  Blood     Basic metabolic panel [[id number]] Collected:  12/28/19 1315    Lab Status:  Final result Specimen:  Blood from Arm, Right Updated:  12/28/19 1345     Sodium 139 mmol/L      Potassium 5 3 mmol/L      Chloride 104 mmol/L      CO2 29 mmol/L      ANION GAP 6 mmol/L      BUN 11 mg/dL      Creatinine 0 72 mg/dL      Glucose 81 mg/dL      Calcium 9 7 mg/dL      eGFR 110 ml/min/1 73sq m     Narrative:       [place] guidelines for Chronic Kidney Disease (CKD):     Stage 1 with normal or high GFR (GFR > 90 mL/min/1 73 square meters)    Stage 2 Mild CKD (GFR = 60-89 mL/min/1 73 square meters)    Stage 3A Moderate CKD (GFR = 45-59 mL/min/1 73 square meters)    Stage 3B Moderate CKD (GFR = 30-44 mL/min/1 73 square meters)    Stage 4 Severe CKD (GFR = 15-29 mL/min/1 73 square meters)    Stage 5 End Stage CKD (GFR <15 mL/min/1 73 square meters)  Note: GFR calculation is accurate only with a steady state creatinine    TSH [[id number]]  (Abnormal) Collected:  12/28/19 1315    Lab Status:  Final result Specimen:  Blood from Arm, Right Updated:  12/28/19 1345     TSH 3RD GENERATON 3 905 uIU/mL     Narrative:       Patients undergoing fluorescein dye angiography may retain small amounts of fluorescein in the body for 48-72 hours post procedure  Samples containing fluorescein can produce falsely depressed TSH values  If the patient had this procedure,a specimen should be resubmitted post fluorescein clearance        CBC and differential [[id number]]  (Abnormal) Collected:  12/28/19 1315    Lab Status:  Final result Specimen:  Blood from Arm, Right Updated:  12/28/19 1322     WBC 6 86 Thousand/uL      RBC 4 53 Million/uL      Hemoglobin 13 3 g/dL      Hematocrit 40 4 %      MCV 89 fL      MCH 29 4 pg      MCHC 32 9 g/dL      RDW 13 7 %      MPV 8 9 fL      Platelets 143 Thousands/uL      nRBC 0 /100 WBCs      Neutrophils Relative 59 %      Immat GRANS % 0 %      Lymphocytes Relative 32 %      Monocytes Relative 7 %      Eosinophils Relative 1 %      Basophils Relative 1 %      Neutrophils Absolute 4 07 Thousands/µL      Immature Grans Absolute 0 02 Thousand/uL      Lymphocytes Absolute 2 20 Thousands/µL      Monocytes Absolute 0 45 Thousand/µL      Eosinophils Absolute 0 07 Thousand/µL      Basophils Absolute 0 05 Thousands/µL                  No orders to display              Procedures  ECG 12 Lead Documentation Only  Date/Time: 12/28/2019 1:46 PM  Performed by: Martinez Ventura PA-C  Authorized by: Martinez Ventura PA-C     Indications / Diagnosis:  Dizziness  ECG reviewed by me, the ED Provider: yes    Patient location:  ED  Previous ECG:     Previous ECG:  Compared to current    Comparison ECG info:  7/16 /2018    Similarity:  No change    Comparison to cardiac monitor: Yes    Interpretation:     Interpretation: normal    Rate:     ECG rate:  67    ECG rate assessment: normal    Rhythm:     Rhythm: sinus rhythm    Ectopy:     Ectopy: none    QRS:     QRS axis:  Normal    QRS intervals:  Normal  Conduction:     Conduction: normal    ST segments:     ST segments:  Normal  T waves:     T waves: normal    Comments:      No signs of acute ischemia,   independently interpreted by me             ED Course                               MDM  Number of Diagnoses or Management Options  Vertigo: new and requires workup     Amount and/or Complexity of Data Reviewed  Clinical lab tests: ordered and reviewed  Tests in the radiology section of CPT®: ordered and reviewed  Tests in the medicine section of CPT®: ordered and reviewed    Risk of Complications, Morbidity, and/or Mortality  Presenting problems: high  Diagnostic procedures: high  Management options: high  General comments: Patient presents to the emergency room with complaints of acute vertigo  She was seen and examined  Laboratory studies were taken and were reviewed  Her EKG did not show any ischemic changes  She was medicated with Ativan and Benadryl and was feeling slightly better  She wanted to be discharged home  I wrote a prescription for meclizine 25 mg 3 times a day as needed  She will have a follow-up exam with her physician in 2-3 days  Should her symptoms worsen, she return to the emergency room for repeat exam and treatment      Patient Progress  Patient progress: stable        Disposition  Final diagnoses:   Vertigo     Time reflects when diagnosis was documented in both MDM as applicable and the Disposition within this note     Time User Action Codes Description Comment    12/28/2019  2:19 PM Gerardine Shed Add [R42] Vertigo       ED Disposition     ED Disposition Condition Date/Time Comment    Discharge Stable Sat Dec 28, 2019  2:21 PM Rashmi Martinez discharge to home/self care  Follow-up Information     Follow up With Specialties Details Why Contact Info    Ольга Tran DO Family Medicine, Wound Care Schedule an appointment as soon as possible for a visit in 2 days  [address] [facility]  [phone]            Discharge Medication List as of 12/28/2019  2:21 PM      START taking these medications    Details   meclizine (ANTIVERT) 25 mg tablet Take 1 tablet (25 mg total) by mouth 3 (three) times a day as needed for dizziness for up to 15 doses, Starting Sat 12/28/2019, Normal         CONTINUE these medications which have NOT CHANGED    Details   busPIRone (BUSPAR) 7 5 mg tablet Take 1 tablet (7 5 mg total) by mouth 2 (two) times a day, Starting Mon 11/25/2019, Until Sun 2/23/2020, Normal      cetirizine (ZyrTEC) 10 mg tablet Take 1 tablet (10 mg total) by mouth daily, Starting u 7/18/2019, No Print      cloNIDine (CATAPRES) 0 1 mg tablet Take 1 tab (0 1 mg) PO Q AM and 2 Tabs (for a total of 0 2 mg) PO Q PM, Normal      Melatonin ER 10 MG TBCR Take 1 tablet by mouth daily, Historical Med      sertraline (ZOLOFT) 50 mg tablet Take 1 tablet (50 mg total) by mouth 2 (two) times a day, Starting Mon 11/25/2019, Until Sun 2/23/2020, Normal      therapeutic multivitamin-minerals (THERAGRAN-M) tablet Take 1 tablet by mouth daily, Historical Med      traZODone (DESYREL) 50 mg tablet Take 1/2 (25 mg) to 1 (50 mg)  tab po q hs prn, Normal           No discharge procedures on file      ED Provider  Electronically Signed by           Allen Aguilar PA-C  12/28/19 0225

## 2025-03-01 NOTE — ED ADULT NURSE NOTE - OBJECTIVE STATEMENT
83 y/o male presents to ED after mechanical [place] [employer] [place] up from seat around 3:30pm today. Pt states he fell back on his butt, back, then his head. Denies loss of consciousness, dizziness before or after fall, headache, neck pain, n/v/d, neuro deficits. at time of assessment, pt is A&OX4, ambulatory with steady gait. pt states there is a "bump" to area on back of head where he fell, no redness or laceration noted. pt takes eliquis for a-fib.

## 2025-03-01 NOTE — ED PROVIDER NOTE - OBJECTIVE STATEMENT
history of afib on eliquis, here with head injury after fall on [place]. Was standing when it suddenly moved causing him to fall back and hit head on ground. Happened about 3 pm. No loc. Denies headache, dizziness, nausea/ vomiting. Does have a bump to top of head. Denies other injury. Ambulating normally.

## 2025-03-01 NOTE — ED ADULT TRIAGE NOTE - CHIEF COMPLAINT QUOTE
Pt [place] with headstrike to posterior head. Bump noted to back of head. Pt on eliquis for a fib. Denies LOC, headache, neck pain, changes in vision, slurred speech, weakness, numbness/tingling. No open wound noted. Pt reports mechanical fall on subway at 3:30PM with headstrike to posterior head. Bump noted to back of head. Pt on eliquis for a fib. Denies LOC, headache, neck pain, changes in vision, slurred speech, weakness, numbness/tingling. No open wound noted.

## 2025-03-01 NOTE — ED PROVIDER NOTE - MUSCULOSKELETAL, MLM
Spine appears normal, range of motion is not limited, no muscle or joint tenderness. no midline spinal tenderness

## 2025-03-01 NOTE — ED PROVIDER NOTE - CLINICAL SUMMARY MEDICAL DECISION MAKING FREE TEXT BOX
mechanical fall with head strike, on eliquis.  neuro intact, well appearing  denies other injury/ ambulatory  ct head done to r/o bleed and neg  head injury/ return precautions discussed

## 2025-03-04 DIAGNOSIS — S00.03XA CONTUSION OF SCALP, INITIAL ENCOUNTER: ICD-10-CM
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
## 2025-03-16 ENCOUNTER — EMERGENCY (EMERGENCY)
Facility: HOSPITAL | Age: 85
LOS: 1 days | Discharge: HOME | End: 2025-03-16
Attending: STUDENT IN AN ORGANIZED HEALTH CARE EDUCATION/TRAINING PROGRAM | Admitting: STUDENT IN AN ORGANIZED HEALTH CARE EDUCATION/TRAINING PROGRAM
Payer: MEDICARE

## 2025-03-16 VITALS
HEART RATE: 59 BPM | TEMPERATURE: 97 F | DIASTOLIC BLOOD PRESSURE: 72 MMHG | OXYGEN SATURATION: 99 % | RESPIRATION RATE: 19 BRPM | SYSTOLIC BLOOD PRESSURE: 148 MMHG

## 2025-03-16 VITALS
OXYGEN SATURATION: 99 % | DIASTOLIC BLOOD PRESSURE: 79 MMHG | WEIGHT: 182.1 LBS | SYSTOLIC BLOOD PRESSURE: 146 MMHG | HEART RATE: 62 BPM | HEIGHT: 70 IN | RESPIRATION RATE: 22 BRPM | TEMPERATURE: 98 F

## 2025-03-16 DIAGNOSIS — Z96.641 PRESENCE OF RIGHT ARTIFICIAL HIP JOINT: Chronic | ICD-10-CM

## 2025-03-16 LAB
ANION GAP SERPL CALC-SCNC: 11 MMOL/L — SIGNIFICANT CHANGE UP (ref 5–17)
BASOPHILS # BLD AUTO: 0.04 K/UL — SIGNIFICANT CHANGE UP (ref 0–0.2)
BASOPHILS NFR BLD AUTO: 0.5 % — SIGNIFICANT CHANGE UP (ref 0–2)
BUN SERPL-MCNC: 18 MG/DL — SIGNIFICANT CHANGE UP (ref 7–23)
CALCIUM SERPL-MCNC: 9 MG/DL — SIGNIFICANT CHANGE UP (ref 8.4–10.5)
CHLORIDE SERPL-SCNC: 108 MMOL/L — SIGNIFICANT CHANGE UP (ref 96–108)
CO2 SERPL-SCNC: 21 MMOL/L — LOW (ref 22–31)
CREAT SERPL-MCNC: 0.92 MG/DL — SIGNIFICANT CHANGE UP (ref 0.5–1.3)
EGFR: 83 ML/MIN/1.73M2 — SIGNIFICANT CHANGE UP
EGFR: 83 ML/MIN/1.73M2 — SIGNIFICANT CHANGE UP
EOSINOPHIL # BLD AUTO: 0.12 K/UL — SIGNIFICANT CHANGE UP (ref 0–0.5)
EOSINOPHIL NFR BLD AUTO: 1.5 % — SIGNIFICANT CHANGE UP (ref 0–6)
GLUCOSE SERPL-MCNC: 87 MG/DL — SIGNIFICANT CHANGE UP (ref 70–99)
HCT VFR BLD CALC: 39.2 % — SIGNIFICANT CHANGE UP (ref 39–50)
HGB BLD-MCNC: 13.2 G/DL — SIGNIFICANT CHANGE UP (ref 13–17)
IMM GRANULOCYTES NFR BLD AUTO: 0.3 % — SIGNIFICANT CHANGE UP (ref 0–0.9)
LYMPHOCYTES # BLD AUTO: 1.92 K/UL — SIGNIFICANT CHANGE UP (ref 1–3.3)
LYMPHOCYTES # BLD AUTO: 24.4 % — SIGNIFICANT CHANGE UP (ref 13–44)
MCHC RBC-ENTMCNC: 31.8 PG — SIGNIFICANT CHANGE UP (ref 27–34)
MCHC RBC-ENTMCNC: 33.7 G/DL — SIGNIFICANT CHANGE UP (ref 32–36)
MCV RBC AUTO: 94.5 FL — SIGNIFICANT CHANGE UP (ref 80–100)
MONOCYTES # BLD AUTO: 0.65 K/UL — SIGNIFICANT CHANGE UP (ref 0–0.9)
MONOCYTES NFR BLD AUTO: 8.3 % — SIGNIFICANT CHANGE UP (ref 2–14)
NEUTROPHILS # BLD AUTO: 5.11 K/UL — SIGNIFICANT CHANGE UP (ref 1.8–7.4)
NEUTROPHILS NFR BLD AUTO: 65 % — SIGNIFICANT CHANGE UP (ref 43–77)
NRBC BLD AUTO-RTO: 0 /100 WBCS — SIGNIFICANT CHANGE UP (ref 0–0)
PLATELET # BLD AUTO: 200 K/UL — SIGNIFICANT CHANGE UP (ref 150–400)
POTASSIUM SERPL-MCNC: 3.5 MMOL/L — SIGNIFICANT CHANGE UP (ref 3.5–5.3)
POTASSIUM SERPL-SCNC: 3.5 MMOL/L — SIGNIFICANT CHANGE UP (ref 3.5–5.3)
RBC # BLD: 4.15 M/UL — LOW (ref 4.2–5.8)
RBC # FLD: 13 % — SIGNIFICANT CHANGE UP (ref 10.3–14.5)
SODIUM SERPL-SCNC: 140 MMOL/L — SIGNIFICANT CHANGE UP (ref 135–145)
TROPONIN T, HIGH SENSITIVITY RESULT: 19 NG/L — SIGNIFICANT CHANGE UP (ref 0–51)
WBC # BLD: 7.86 K/UL — SIGNIFICANT CHANGE UP (ref 3.8–10.5)
WBC # FLD AUTO: 7.86 K/UL — SIGNIFICANT CHANGE UP (ref 3.8–10.5)

## 2025-03-16 PROCEDURE — 36415 COLL VENOUS BLD VENIPUNCTURE: CPT

## 2025-03-16 PROCEDURE — 93005 ELECTROCARDIOGRAM TRACING: CPT

## 2025-03-16 PROCEDURE — 71046 X-RAY EXAM CHEST 2 VIEWS: CPT | Mod: 26

## 2025-03-16 PROCEDURE — 84484 ASSAY OF TROPONIN QUANT: CPT

## 2025-03-16 PROCEDURE — 71046 X-RAY EXAM CHEST 2 VIEWS: CPT

## 2025-03-16 PROCEDURE — 85025 COMPLETE CBC W/AUTO DIFF WBC: CPT

## 2025-03-16 PROCEDURE — 93010 ELECTROCARDIOGRAM REPORT: CPT

## 2025-03-16 PROCEDURE — 99285 EMERGENCY DEPT VISIT HI MDM: CPT | Mod: 25

## 2025-03-16 PROCEDURE — 99285 EMERGENCY DEPT VISIT HI MDM: CPT

## 2025-03-16 PROCEDURE — 80048 BASIC METABOLIC PNL TOTAL CA: CPT

## 2025-03-16 NOTE — ED PROVIDER NOTE - CLINICAL SUMMARY MEDICAL DECISION MAKING FREE TEXT BOX
84M hx of afib on eliquis, here for episode of feeling short of breath and light headed walking in [place] during [place] celebration while looking for hotel he was meeting his daughter at for lunch. Now feeling back to normal. Denies chest pain. 84M hx of afib on eliquis, here for episode of feeling short of breath and light headed walking in [place] during [place] celebration while looking for hotel he was meeting his daughter at for lunch. Now feeling back to normal. Denies chest pain.    EKG sinus razia, asymptomatic at this time. HR normal now. Will dc with wife now.

## 2025-03-16 NOTE — ED ADULT NURSE NOTE - OBJECTIVE STATEMENT
Pt reports dyspnea [place], acute onset, reports "I felt like I could have passed out"  denies syncope or dizziness, reports similar episode ~6 weeks ago. Denies angina, at neurologic baseline Pt reports dyspnea [place], acute onset, reports "I felt like I could have passed out"  denies syncope endorses dizziness, reports similar episode ~6 weeks ago. Denies angina, at neurologic baseline

## 2025-03-16 NOTE — ED PROVIDER NOTE - PATIENT PORTAL LINK FT
You can access the FollowMyHealth Patient Portal offered by [facility] by registering at the following website: [email or web address]. By joining [employer]’s FollowMyHealth portal, you will also be able to view your health information using other applications (apps) compatible with our system.

## 2025-03-16 NOTE — ED PROVIDER NOTE - OBJECTIVE STATEMENT
84M hx of afib on eliquis, here for episode of feeling short of breath and light headed walking in [place] during [place] celebration while looking for hotel he was meeting his daughter at for lunch. Now feeling back to normal. Denies chest pain.

## 2025-03-19 DIAGNOSIS — I48.91 UNSPECIFIED ATRIAL FIBRILLATION: ICD-10-CM

## 2025-03-19 DIAGNOSIS — R42 DIZZINESS AND GIDDINESS: ICD-10-CM

## 2025-03-19 DIAGNOSIS — R06.02 SHORTNESS OF BREATH: ICD-10-CM

## 2025-03-19 DIAGNOSIS — Z79.01 LONG TERM (CURRENT) USE OF ANTICOAGULANTS: ICD-10-CM

## 2025-08-12 ENCOUNTER — APPOINTMENT (OUTPATIENT)
Dept: HEART AND VASCULAR | Facility: CLINIC | Age: 85
End: 2025-08-12
Payer: MEDICARE

## 2025-08-12 ENCOUNTER — NON-APPOINTMENT (OUTPATIENT)
Age: 85
End: 2025-08-12

## 2025-08-12 VITALS
WEIGHT: 187 LBS | BODY MASS INDEX: 26.77 KG/M2 | DIASTOLIC BLOOD PRESSURE: 87 MMHG | OXYGEN SATURATION: 97 % | SYSTOLIC BLOOD PRESSURE: 146 MMHG | HEIGHT: 70 IN | HEART RATE: 64 BPM

## 2025-08-12 DIAGNOSIS — I25.10 ATHEROSCLEROTIC HEART DISEASE OF NATIVE CORONARY ARTERY W/OUT ANGINA PECTORIS: ICD-10-CM

## 2025-08-12 DIAGNOSIS — I48.0 PAROXYSMAL ATRIAL FIBRILLATION: ICD-10-CM

## 2025-08-12 PROCEDURE — 99214 OFFICE O/P EST MOD 30 MIN: CPT

## 2025-09-03 ENCOUNTER — APPOINTMENT (OUTPATIENT)
Dept: CT IMAGING | Facility: CLINIC | Age: 85
End: 2025-09-03
Payer: MEDICARE

## 2025-09-03 ENCOUNTER — OUTPATIENT (OUTPATIENT)
Dept: OUTPATIENT SERVICES | Facility: HOSPITAL | Age: 85
LOS: 1 days | End: 2025-09-03

## 2025-09-03 DIAGNOSIS — Z96.641 PRESENCE OF RIGHT ARTIFICIAL HIP JOINT: Chronic | ICD-10-CM

## 2025-09-03 PROCEDURE — 75574 CT ANGIO HRT W/3D IMAGE: CPT | Mod: 26
